# Patient Record
Sex: FEMALE | Race: WHITE | Employment: OTHER
[De-identification: names, ages, dates, MRNs, and addresses within clinical notes are randomized per-mention and may not be internally consistent; named-entity substitution may affect disease eponyms.]

---

## 2017-03-09 ENCOUNTER — OFFICE VISIT (OUTPATIENT)
Dept: FAMILY MEDICINE CLINIC | Facility: CLINIC | Age: 27
End: 2017-03-09

## 2017-03-09 VITALS — WEIGHT: 76 LBS | DIASTOLIC BLOOD PRESSURE: 60 MMHG | BODY MASS INDEX: 19.02 KG/M2 | SYSTOLIC BLOOD PRESSURE: 120 MMHG

## 2017-03-09 DIAGNOSIS — G80.9 INFANTILE CEREBRAL PALSY (HCC): ICD-10-CM

## 2017-03-09 DIAGNOSIS — M25.572 CHRONIC PAIN OF BOTH ANKLES: Primary | ICD-10-CM

## 2017-03-09 DIAGNOSIS — R56.9 SEIZURE (HCC): ICD-10-CM

## 2017-03-09 DIAGNOSIS — M25.571 CHRONIC PAIN OF BOTH ANKLES: Primary | ICD-10-CM

## 2017-03-09 DIAGNOSIS — K59.09 OTHER CONSTIPATION: ICD-10-CM

## 2017-03-09 DIAGNOSIS — G89.29 CHRONIC PAIN OF BOTH ANKLES: Primary | ICD-10-CM

## 2017-03-09 PROCEDURE — 99214 OFFICE O/P EST MOD 30 MIN: CPT | Performed by: FAMILY MEDICINE

## 2017-03-09 RX ORDER — ESLICARBAZEPINE ACETATE 400 MG/1
TABLET ORAL
Refills: 2 | COMMUNITY
Start: 2017-03-05 | End: 2020-10-29 | Stop reason: SDUPTHER

## 2017-03-09 ASSESSMENT — PATIENT HEALTH QUESTIONNAIRE - PHQ9
SUM OF ALL RESPONSES TO PHQ QUESTIONS 1-9: 0
1. LITTLE INTEREST OR PLEASURE IN DOING THINGS: 0
2. FEELING DOWN, DEPRESSED OR HOPELESS: 0
SUM OF ALL RESPONSES TO PHQ9 QUESTIONS 1 & 2: 0

## 2017-03-11 ASSESSMENT — ENCOUNTER SYMPTOMS
COUGH: 0
EYE DISCHARGE: 0
VOMITING: 0
CONSTIPATION: 1
ABDOMINAL PAIN: 0
EYE REDNESS: 0
FACIAL SWELLING: 0
NAUSEA: 0
SHORTNESS OF BREATH: 0

## 2017-03-21 RX ORDER — POLYETHYLENE GLYCOL 3350 17 G/17G
17 POWDER, FOR SOLUTION ORAL DAILY
Qty: 850 G | Refills: 3 | Status: SHIPPED | OUTPATIENT
Start: 2017-03-21 | End: 2017-08-24 | Stop reason: SDUPTHER

## 2017-03-29 ENCOUNTER — HOSPITAL ENCOUNTER (OUTPATIENT)
Dept: GENERAL RADIOLOGY | Age: 27
Discharge: HOME | End: 2017-03-29
Payer: MEDICAID

## 2017-03-29 ENCOUNTER — HOSPITAL ENCOUNTER (OUTPATIENT)
Age: 27
Discharge: HOME | End: 2017-03-29
Payer: MEDICAID

## 2017-03-29 ENCOUNTER — HOSPITAL ENCOUNTER (OUTPATIENT)
Age: 27
End: 2017-03-29
Payer: MEDICAID

## 2017-03-29 DIAGNOSIS — M25.572 CHRONIC PAIN OF BOTH ANKLES: ICD-10-CM

## 2017-03-29 DIAGNOSIS — G89.29 CHRONIC PAIN OF BOTH ANKLES: ICD-10-CM

## 2017-03-29 DIAGNOSIS — M25.571 CHRONIC PAIN OF BOTH ANKLES: ICD-10-CM

## 2017-03-29 DIAGNOSIS — G80.9 INFANTILE CEREBRAL PALSY (HCC): ICD-10-CM

## 2017-03-29 LAB
ABSOLUTE EOS #: 0.1 K/UL (ref 0–0.4)
ABSOLUTE LYMPH #: 1.6 K/UL (ref 1.1–2.7)
ABSOLUTE MONO #: 0.3 K/UL (ref 0–1)
ALBUMIN SERPL-MCNC: 4.2 G/DL (ref 3.5–5.2)
ALBUMIN/GLOBULIN RATIO: ABNORMAL (ref 1–2.5)
ALP BLD-CCNC: 127 U/L (ref 35–104)
ALT SERPL-CCNC: 16 U/L (ref 5–33)
AMMONIA: 39 UMOL/L (ref 11–51)
ANION GAP SERPL CALCULATED.3IONS-SCNC: 14 MMOL/L (ref 9–17)
AST SERPL-CCNC: 10 U/L
BASOPHILS # BLD: 1 % (ref 0–2)
BASOPHILS ABSOLUTE: 0 K/UL (ref 0–0.2)
BILIRUB SERPL-MCNC: 0.2 MG/DL (ref 0.3–1.2)
BILIRUBIN DIRECT: <0.08 MG/DL
BILIRUBIN, INDIRECT: ABNORMAL MG/DL (ref 0–1)
BUN BLDV-MCNC: 11 MG/DL (ref 6–20)
BUN/CREAT BLD: 21 (ref 9–20)
CALCIUM SERPL-MCNC: 9 MG/DL (ref 8.6–10.4)
CARBAMAZEPINE DATE LAST DOSE: ABNORMAL
CARBAMAZEPINE DOSE AMOUNT: ABNORMAL
CARBAMAZEPINE DOSE TIME: ABNORMAL
CARBAMAZEPINE LEVEL: <2.5 UG/ML (ref 8–12)
CHLORIDE BLD-SCNC: 103 MMOL/L (ref 98–107)
CO2: 23 MMOL/L (ref 20–31)
CREAT SERPL-MCNC: 0.53 MG/DL (ref 0.5–0.9)
DIFFERENTIAL TYPE: YES
EOSINOPHILS RELATIVE PERCENT: 1 % (ref 0–5)
GFR AFRICAN AMERICAN: >60 ML/MIN
GFR NON-AFRICAN AMERICAN: >60 ML/MIN
GFR SERPL CREATININE-BSD FRML MDRD: ABNORMAL ML/MIN/{1.73_M2}
GFR SERPL CREATININE-BSD FRML MDRD: ABNORMAL ML/MIN/{1.73_M2}
GLOBULIN: ABNORMAL G/DL (ref 1.5–3.8)
GLUCOSE BLD-MCNC: 89 MG/DL (ref 70–99)
HCT VFR BLD CALC: 43.4 % (ref 36–46)
HEMOGLOBIN: 14.1 G/DL (ref 12–16)
LYMPHOCYTES # BLD: 30 % (ref 15–40)
MCH RBC QN AUTO: 30.1 PG (ref 26–34)
MCHC RBC AUTO-ENTMCNC: 32.6 G/DL (ref 31–37)
MCV RBC AUTO: 92.4 FL (ref 80–100)
MONOCYTES # BLD: 5 % (ref 4–8)
PDW BLD-RTO: 14.4 % (ref 12.1–15.2)
PHENOBARBITAL DATE LAST DOSE: ABNORMAL
PHENOBARBITAL DOSE AMOUNT: ABNORMAL
PHENOBARBITAL TIME LAST DOSE: ABNORMAL
PHENOBARBITAL: 44.6 UG/ML (ref 15–40)
PLATELET # BLD: 191 K/UL (ref 140–450)
PLATELET ESTIMATE: NORMAL
PMV BLD AUTO: NORMAL FL (ref 6–12)
POTASSIUM SERPL-SCNC: 4.3 MMOL/L (ref 3.7–5.3)
RBC # BLD: 4.69 M/UL (ref 4–5.2)
RBC # BLD: NORMAL 10*6/UL
SEG NEUTROPHILS: 63 % (ref 47–75)
SEGMENTED NEUTROPHILS ABSOLUTE COUNT: 3.3 K/UL (ref 2.5–7)
SODIUM BLD-SCNC: 140 MMOL/L (ref 135–144)
TOTAL PROTEIN: 7.5 G/DL (ref 6.4–8.3)
WBC # BLD: 5.2 K/UL (ref 3.5–11)
WBC # BLD: NORMAL 10*3/UL

## 2017-03-29 PROCEDURE — 85025 COMPLETE CBC W/AUTO DIFF WBC: CPT

## 2017-03-29 PROCEDURE — 80184 ASSAY OF PHENOBARBITAL: CPT

## 2017-03-29 PROCEDURE — 80156 ASSAY CARBAMAZEPINE TOTAL: CPT

## 2017-03-29 PROCEDURE — 73610 X-RAY EXAM OF ANKLE: CPT

## 2017-03-29 PROCEDURE — 80076 HEPATIC FUNCTION PANEL: CPT

## 2017-03-29 PROCEDURE — 82140 ASSAY OF AMMONIA: CPT

## 2017-03-29 PROCEDURE — 80048 BASIC METABOLIC PNL TOTAL CA: CPT

## 2017-03-29 PROCEDURE — 36415 COLL VENOUS BLD VENIPUNCTURE: CPT

## 2017-04-25 ENCOUNTER — HOSPITAL ENCOUNTER (OUTPATIENT)
Age: 27
Discharge: HOME | End: 2017-04-25
Payer: MEDICAID

## 2017-04-25 LAB
ABSOLUTE EOS #: 0.1 K/UL (ref 0–0.4)
ABSOLUTE LYMPH #: 1.7 K/UL (ref 1.1–2.7)
ABSOLUTE MONO #: 0.3 K/UL (ref 0–1)
ALBUMIN SERPL-MCNC: 4.1 G/DL (ref 3.5–5.2)
ALBUMIN/GLOBULIN RATIO: ABNORMAL (ref 1–2.5)
ALP BLD-CCNC: 135 U/L (ref 35–104)
ALT SERPL-CCNC: 16 U/L (ref 5–33)
AMMONIA: 34 UMOL/L (ref 11–51)
ANION GAP SERPL CALCULATED.3IONS-SCNC: 15 MMOL/L (ref 9–17)
AST SERPL-CCNC: 13 U/L
BASOPHILS # BLD: 1 %
BASOPHILS ABSOLUTE: 0.1 K/UL (ref 0–0.2)
BILIRUB SERPL-MCNC: 0.2 MG/DL (ref 0.3–1.2)
BILIRUBIN DIRECT: <0.08 MG/DL
BILIRUBIN, INDIRECT: ABNORMAL MG/DL (ref 0–1)
BUN BLDV-MCNC: 6 MG/DL (ref 6–20)
BUN/CREAT BLD: 12 (ref 9–20)
CALCIUM SERPL-MCNC: 9.6 MG/DL (ref 8.6–10.4)
CARBAMAZEPINE DATE LAST DOSE: ABNORMAL
CARBAMAZEPINE DOSE AMOUNT: ABNORMAL
CARBAMAZEPINE DOSE TIME: ABNORMAL
CARBAMAZEPINE LEVEL: <2.5 UG/ML (ref 8–12)
CHLORIDE BLD-SCNC: 101 MMOL/L (ref 98–107)
CO2: 22 MMOL/L (ref 20–31)
CREAT SERPL-MCNC: 0.51 MG/DL (ref 0.5–0.9)
DIFFERENTIAL TYPE: YES
EOSINOPHILS RELATIVE PERCENT: 1 %
GFR AFRICAN AMERICAN: >60 ML/MIN
GFR NON-AFRICAN AMERICAN: >60 ML/MIN
GFR SERPL CREATININE-BSD FRML MDRD: NORMAL ML/MIN/{1.73_M2}
GFR SERPL CREATININE-BSD FRML MDRD: NORMAL ML/MIN/{1.73_M2}
GLOBULIN: ABNORMAL G/DL (ref 1.5–3.8)
GLUCOSE BLD-MCNC: 86 MG/DL (ref 70–99)
HCT VFR BLD CALC: 42.7 % (ref 36–46)
HEMOGLOBIN: 13.9 G/DL (ref 12–16)
LYMPHOCYTES # BLD: 25 %
MCH RBC QN AUTO: 29.5 PG (ref 26–34)
MCHC RBC AUTO-ENTMCNC: 32.6 G/DL (ref 31–37)
MCV RBC AUTO: 90.4 FL (ref 80–100)
MONOCYTES # BLD: 5 %
PDW BLD-RTO: 14.1 % (ref 12.1–15.2)
PHENOBARBITAL DATE LAST DOSE: ABNORMAL
PHENOBARBITAL DOSE AMOUNT: ABNORMAL
PHENOBARBITAL TIME LAST DOSE: ABNORMAL
PHENOBARBITAL: 45.9 UG/ML (ref 15–40)
PLATELET # BLD: 260 K/UL (ref 140–450)
PLATELET ESTIMATE: NORMAL
PMV BLD AUTO: NORMAL FL (ref 6–12)
POTASSIUM SERPL-SCNC: 4.2 MMOL/L (ref 3.7–5.3)
RBC # BLD: 4.72 M/UL (ref 4–5.2)
RBC # BLD: NORMAL 10*6/UL
SEG NEUTROPHILS: 68 %
SEGMENTED NEUTROPHILS ABSOLUTE COUNT: 4.6 K/UL (ref 2.5–7)
SODIUM BLD-SCNC: 138 MMOL/L (ref 135–144)
TOTAL PROTEIN: 7.7 G/DL (ref 6.4–8.3)
WBC # BLD: 6.8 K/UL (ref 3.5–11)
WBC # BLD: NORMAL 10*3/UL

## 2017-04-25 PROCEDURE — 80184 ASSAY OF PHENOBARBITAL: CPT

## 2017-04-25 PROCEDURE — 80076 HEPATIC FUNCTION PANEL: CPT

## 2017-04-25 PROCEDURE — 36415 COLL VENOUS BLD VENIPUNCTURE: CPT

## 2017-04-25 PROCEDURE — 80156 ASSAY CARBAMAZEPINE TOTAL: CPT

## 2017-04-25 PROCEDURE — 85025 COMPLETE CBC W/AUTO DIFF WBC: CPT

## 2017-04-25 PROCEDURE — 80048 BASIC METABOLIC PNL TOTAL CA: CPT

## 2017-04-25 PROCEDURE — 82140 ASSAY OF AMMONIA: CPT

## 2017-07-24 ENCOUNTER — TELEPHONE (OUTPATIENT)
Dept: FAMILY MEDICINE CLINIC | Age: 27
End: 2017-07-24

## 2017-08-24 ENCOUNTER — OFFICE VISIT (OUTPATIENT)
Dept: FAMILY MEDICINE CLINIC | Age: 27
End: 2017-08-24
Payer: MEDICAID

## 2017-08-24 DIAGNOSIS — R56.9 SEIZURE (HCC): Primary | ICD-10-CM

## 2017-08-24 DIAGNOSIS — G80.9 INFANTILE CEREBRAL PALSY (HCC): ICD-10-CM

## 2017-08-24 DIAGNOSIS — K59.09 OTHER CONSTIPATION: ICD-10-CM

## 2017-08-24 PROCEDURE — 99214 OFFICE O/P EST MOD 30 MIN: CPT | Performed by: FAMILY MEDICINE

## 2017-08-24 RX ORDER — POLYETHYLENE GLYCOL 3350 17 G/17G
17 POWDER, FOR SOLUTION ORAL DAILY
Qty: 850 G | Refills: 3 | Status: SHIPPED | OUTPATIENT
Start: 2017-08-24 | End: 2018-03-22 | Stop reason: SDUPTHER

## 2017-08-24 ASSESSMENT — ENCOUNTER SYMPTOMS
FACIAL SWELLING: 0
SHORTNESS OF BREATH: 0
ABDOMINAL PAIN: 0
EYE REDNESS: 0
DIARRHEA: 0
CONSTIPATION: 1
EYE DISCHARGE: 0
VOMITING: 0
COUGH: 0

## 2017-08-28 ENCOUNTER — TELEPHONE (OUTPATIENT)
Dept: FAMILY MEDICINE CLINIC | Age: 27
End: 2017-08-28

## 2017-09-26 DIAGNOSIS — G80.9 INFANTILE CEREBRAL PALSY (HCC): Primary | ICD-10-CM

## 2018-01-22 DIAGNOSIS — G80.9 INFANTILE CEREBRAL PALSY (HCC): ICD-10-CM

## 2018-01-22 DIAGNOSIS — N39.42 URINARY INCONTINENCE WITHOUT SENSORY AWARENESS: Primary | ICD-10-CM

## 2018-03-22 ENCOUNTER — OFFICE VISIT (OUTPATIENT)
Dept: FAMILY MEDICINE CLINIC | Age: 28
End: 2018-03-22
Payer: MEDICAID

## 2018-03-22 VITALS — DIASTOLIC BLOOD PRESSURE: 80 MMHG | TEMPERATURE: 98.2 F | SYSTOLIC BLOOD PRESSURE: 116 MMHG

## 2018-03-22 DIAGNOSIS — R56.9 SEIZURE (HCC): Primary | ICD-10-CM

## 2018-03-22 DIAGNOSIS — H61.21 IMPACTED CERUMEN OF RIGHT EAR: ICD-10-CM

## 2018-03-22 DIAGNOSIS — F32.89 OTHER DEPRESSION: ICD-10-CM

## 2018-03-22 DIAGNOSIS — K59.09 OTHER CONSTIPATION: ICD-10-CM

## 2018-03-22 PROCEDURE — 99214 OFFICE O/P EST MOD 30 MIN: CPT | Performed by: FAMILY MEDICINE

## 2018-03-22 RX ORDER — QUETIAPINE FUMARATE 25 MG/1
TABLET, FILM COATED ORAL
Refills: 1 | COMMUNITY
Start: 2018-02-20

## 2018-03-22 RX ORDER — POLYETHYLENE GLYCOL 3350 17 G/17G
17 POWDER, FOR SOLUTION ORAL DAILY
Qty: 850 G | Refills: 3 | Status: SHIPPED | OUTPATIENT
Start: 2018-03-22 | End: 2019-05-13 | Stop reason: SDUPTHER

## 2018-03-22 NOTE — PROGRESS NOTES
DO Nisa   PERIOGARD 0.12 % solution Rinse with 1/2 ounce TWICE DAILY 8/16/16   Historical Provider, MD   Misc. Devices ([address]) [address] [facility] Gait  with wheels, square with seat sling seat and braces 6/17/16   Henry Newman DO   naproxen sodium (ANAPROX) 550 MG tablet  6/1/16   Historical Provider, MD   Ankle Foot Arthosis MISC by Does not apply route. Bilateral AFO brace 2/4/15   Lisa Carnes MD   ranitidine (ZANTAC) 150 MG tablet Take 150 mg by mouth 2 times daily. Historical Provider, MD        Allergies:       Patient has no known allergies. Social History:     Tobacco:    reports that she has never smoked. She has never used smokeless tobacco.  Alcohol:      reports that she does not drink alcohol. Drug Use:  has no drug history on file. Family History:     History reviewed. No pertinent family history. Review of Systems:       Review of Systems   Constitutional: Positive for activity change. Negative for chills and fever. Not using walker anymore. HENT:        Rt ear fullness   Eyes: Negative for discharge and redness. Respiratory: Negative for cough and shortness of breath. Gastrointestinal: Positive for constipation. Negative for blood in stool, diarrhea and vomiting. Genitourinary: Negative for difficulty urinating. Skin: Negative for pallor and rash. Neurological: Negative for dizziness, seizures, syncope, facial asymmetry and light-headedness. Psychiatric/Behavioral: Positive for dysphoric mood. The patient is nervous/anxious. Physical Exam:     Physical Exam   Constitutional: She is oriented to person, place, and time. She appears well-developed and well-nourished. HENT:   Head: Normocephalic and atraumatic. Left Ear: Tympanic membrane is not injected, not erythematous and not bulging. Rt TM not seen due to cerumen impaction in the ear canal.   Eyes: Conjunctivae are normal. Pupils are equal, round, and reactive to light.    Neck: Neck

## 2018-03-28 ASSESSMENT — ENCOUNTER SYMPTOMS
EYE REDNESS: 0
BLOOD IN STOOL: 0
COUGH: 0
SHORTNESS OF BREATH: 0
DIARRHEA: 0
CONSTIPATION: 1
EYE DISCHARGE: 0
VOMITING: 0

## 2018-10-16 DIAGNOSIS — N39.42 URINARY INCONTINENCE WITHOUT SENSORY AWARENESS: ICD-10-CM

## 2018-10-16 DIAGNOSIS — G80.9 INFANTILE CEREBRAL PALSY (HCC): ICD-10-CM

## 2018-11-20 ENCOUNTER — HOSPITAL ENCOUNTER (OUTPATIENT)
Dept: CT IMAGING | Age: 28
Discharge: HOME | End: 2018-11-22
Payer: MEDICAID

## 2018-11-20 ENCOUNTER — HOSPITAL ENCOUNTER (OUTPATIENT)
Age: 28
Discharge: HOME | End: 2018-11-20
Payer: MEDICAID

## 2018-11-20 DIAGNOSIS — G40.219 LOCALIZATION-RELATED (FOCAL) (PARTIAL) SYMPTOMATIC EPILEPSY AND EPILEPTIC SYNDROMES WITH COMPLEX PARTIAL SEIZURES, INTRACTABLE, WITHOUT STATUS EPILEPTICUS (HCC): ICD-10-CM

## 2018-11-20 LAB
ABSOLUTE EOS #: 0.1 K/UL (ref 0–0.4)
ABSOLUTE IMMATURE GRANULOCYTE: NORMAL K/UL (ref 0–0.3)
ABSOLUTE LYMPH #: 1.1 K/UL (ref 1–4.8)
ABSOLUTE MONO #: 0.2 K/UL (ref 0–1)
ALBUMIN SERPL-MCNC: 4.6 G/DL (ref 3.5–5.2)
ALBUMIN/GLOBULIN RATIO: ABNORMAL (ref 1–2.5)
ALP BLD-CCNC: 99 U/L (ref 35–104)
ALT SERPL-CCNC: 14 U/L (ref 5–33)
ANION GAP SERPL CALCULATED.3IONS-SCNC: 14 MMOL/L (ref 9–17)
AST SERPL-CCNC: 12 U/L
BASOPHILS # BLD: 0 % (ref 0–2)
BASOPHILS ABSOLUTE: 0 K/UL (ref 0–0.2)
BILIRUB SERPL-MCNC: 0.17 MG/DL (ref 0.3–1.2)
BILIRUBIN DIRECT: <0.08 MG/DL
BILIRUBIN, INDIRECT: ABNORMAL MG/DL (ref 0–1)
BUN BLDV-MCNC: 7 MG/DL (ref 6–20)
BUN/CREAT BLD: 13 (ref 9–20)
CALCIUM SERPL-MCNC: 9.3 MG/DL (ref 8.6–10.4)
CHLORIDE BLD-SCNC: 105 MMOL/L (ref 98–107)
CO2: 22 MMOL/L (ref 20–31)
CREAT SERPL-MCNC: 0.54 MG/DL (ref 0.5–0.9)
DIFFERENTIAL TYPE: YES
EOSINOPHILS RELATIVE PERCENT: 1 % (ref 0–5)
GFR AFRICAN AMERICAN: >60 ML/MIN
GFR NON-AFRICAN AMERICAN: >60 ML/MIN
GFR SERPL CREATININE-BSD FRML MDRD: ABNORMAL ML/MIN/{1.73_M2}
GFR SERPL CREATININE-BSD FRML MDRD: ABNORMAL ML/MIN/{1.73_M2}
GLOBULIN: ABNORMAL G/DL (ref 1.5–3.8)
GLUCOSE BLD-MCNC: 100 MG/DL (ref 70–99)
HCT VFR BLD CALC: 45.1 % (ref 36–46)
HEMOGLOBIN: 14.6 G/DL (ref 12–16)
IMMATURE GRANULOCYTES: NORMAL %
LYMPHOCYTES # BLD: 21 % (ref 15–40)
MCH RBC QN AUTO: 30 PG (ref 26–34)
MCHC RBC AUTO-ENTMCNC: 32.4 G/DL (ref 31–37)
MCV RBC AUTO: 92.6 FL (ref 80–100)
MONOCYTES # BLD: 4 % (ref 4–8)
NRBC AUTOMATED: NORMAL PER 100 WBC
PDW BLD-RTO: 14.8 % (ref 12.1–15.2)
PHENOBARBITAL DATE LAST DOSE: ABNORMAL
PHENOBARBITAL DOSE AMOUNT: ABNORMAL
PHENOBARBITAL TIME LAST DOSE: ABNORMAL
PHENOBARBITAL: 43.7 UG/ML (ref 15–40)
PLATELET # BLD: 204 K/UL (ref 140–450)
PLATELET ESTIMATE: NORMAL
PMV BLD AUTO: NORMAL FL (ref 6–12)
POTASSIUM SERPL-SCNC: 4.7 MMOL/L (ref 3.7–5.3)
RBC # BLD: 4.87 M/UL (ref 4–5.2)
RBC # BLD: NORMAL 10*6/UL
SEG NEUTROPHILS: 74 % (ref 47–75)
SEGMENTED NEUTROPHILS ABSOLUTE COUNT: 3.9 K/UL (ref 2.5–7)
SODIUM BLD-SCNC: 141 MMOL/L (ref 135–144)
TOTAL PROTEIN: 7.9 G/DL (ref 6.4–8.3)
WBC # BLD: 5.4 K/UL (ref 3.5–11)
WBC # BLD: NORMAL 10*3/UL

## 2018-11-20 PROCEDURE — 70470 CT HEAD/BRAIN W/O & W/DYE: CPT

## 2018-11-20 PROCEDURE — 36415 COLL VENOUS BLD VENIPUNCTURE: CPT

## 2018-11-20 PROCEDURE — 80048 BASIC METABOLIC PNL TOTAL CA: CPT

## 2018-11-20 PROCEDURE — 6360000004 HC RX CONTRAST MEDICATION: Performed by: PSYCHIATRY & NEUROLOGY

## 2018-11-20 PROCEDURE — 80184 ASSAY OF PHENOBARBITAL: CPT

## 2018-11-20 PROCEDURE — 80076 HEPATIC FUNCTION PANEL: CPT

## 2018-11-20 PROCEDURE — 85025 COMPLETE CBC W/AUTO DIFF WBC: CPT

## 2018-11-20 RX ADMIN — IOPAMIDOL 50 ML: 612 INJECTION, SOLUTION INTRAVENOUS at 14:08

## 2019-05-14 RX ORDER — POLYETHYLENE GLYCOL 3350 17 G/17G
POWDER, FOR SOLUTION ORAL
Qty: 510 G | Refills: 1 | Status: SHIPPED | OUTPATIENT
Start: 2019-05-14 | End: 2019-12-19 | Stop reason: SDUPTHER

## 2019-06-19 DIAGNOSIS — N39.42 URINARY INCONTINENCE WITHOUT SENSORY AWARENESS: ICD-10-CM

## 2019-06-19 DIAGNOSIS — G80.9 INFANTILE CEREBRAL PALSY (HCC): ICD-10-CM

## 2019-06-19 NOTE — TELEPHONE ENCOUNTER
Requesting a refill on Diapers and [address] [facility] Maintenance   Topic Date Due    Varicella Vaccine (1 of 2 - 13+ 2-dose series) 07/09/2003    HIV screen  07/09/2005    Cervical cancer screen  07/09/2011    Flu vaccine (Season Ended) 09/01/2019    DTaP/Tdap/Td vaccine (2 - Td) 06/10/2026    Pneumococcal 0-64 years Vaccine  Aged Out             (applicable per patient's age: Cancer Screenings, Depression Screening, Fall Risk Screening, Immunizations)    AST (U/L)   Date Value   11/20/2018 12     ALT (U/L)   Date Value   11/20/2018 14     BUN (mg/dL)   Date Value   11/20/2018 7      (goal A1C is < 7)   (goal LDL is <100) need 30-50% reduction from baseline     BP Readings from Last 3 Encounters:   03/22/18 116/80   03/09/17 120/60   12/08/16 108/64    (goal /80)      All Future Testing planned in [facility]:  Lab Frequency Next Occurrence       Next Visit Date:  No future appointments.          Patient Active Problem List:     Infantile cerebral palsy (HCC)     Congenital quadriplegia     Seizure ([facility] [address]     Pyelonephritis due to Escherichia coli     Ureteral stone with hydronephrosis     Renal lithiasis     Chronic bilateral low back pain without sciatica

## 2019-12-19 DIAGNOSIS — G80.9 INFANTILE CEREBRAL PALSY (HCC): ICD-10-CM

## 2019-12-19 DIAGNOSIS — N39.42 URINARY INCONTINENCE WITHOUT SENSORY AWARENESS: ICD-10-CM

## 2019-12-19 RX ORDER — POLYETHYLENE GLYCOL 3350 17 G/17G
POWDER, FOR SOLUTION ORAL
Qty: 510 G | Refills: 1 | Status: SHIPPED | OUTPATIENT
Start: 2019-12-19 | End: 2020-07-13 | Stop reason: SDUPTHER

## 2020-02-07 ENCOUNTER — HOSPITAL ENCOUNTER (OUTPATIENT)
Age: 30
Discharge: HOME | End: 2020-02-07
Payer: MEDICAID

## 2020-02-07 LAB
ABSOLUTE EOS #: 0.1 K/UL (ref 0–0.4)
ABSOLUTE IMMATURE GRANULOCYTE: ABNORMAL K/UL (ref 0–0.3)
ABSOLUTE LYMPH #: 1.3 K/UL (ref 1–4.8)
ABSOLUTE MONO #: 0.3 K/UL (ref 0–1)
ALBUMIN SERPL-MCNC: 4.6 G/DL (ref 3.5–5.2)
ALBUMIN/GLOBULIN RATIO: ABNORMAL (ref 1–2.5)
ALP BLD-CCNC: 156 U/L (ref 35–104)
ALT SERPL-CCNC: 11 U/L (ref 5–33)
ANION GAP SERPL CALCULATED.3IONS-SCNC: 15 MMOL/L (ref 9–17)
AST SERPL-CCNC: 11 U/L
BASOPHILS # BLD: 1 % (ref 0–2)
BASOPHILS ABSOLUTE: 0 K/UL (ref 0–0.2)
BILIRUB SERPL-MCNC: 0.2 MG/DL (ref 0.3–1.2)
BILIRUBIN DIRECT: <0.08 MG/DL
BILIRUBIN, INDIRECT: ABNORMAL MG/DL (ref 0–1)
BUN BLDV-MCNC: 13 MG/DL (ref 6–20)
BUN/CREAT BLD: 27 (ref 9–20)
CALCIUM SERPL-MCNC: 9.8 MG/DL (ref 8.6–10.4)
CHLORIDE BLD-SCNC: 104 MMOL/L (ref 98–107)
CO2: 19 MMOL/L (ref 20–31)
CREAT SERPL-MCNC: 0.48 MG/DL (ref 0.5–0.9)
DIFFERENTIAL TYPE: ABNORMAL
EOSINOPHILS RELATIVE PERCENT: 1 % (ref 0–5)
GFR AFRICAN AMERICAN: >60 ML/MIN
GFR NON-AFRICAN AMERICAN: >60 ML/MIN
GFR SERPL CREATININE-BSD FRML MDRD: ABNORMAL ML/MIN/{1.73_M2}
GFR SERPL CREATININE-BSD FRML MDRD: ABNORMAL ML/MIN/{1.73_M2}
GLOBULIN: ABNORMAL G/DL (ref 1.5–3.8)
GLUCOSE BLD-MCNC: 106 MG/DL (ref 70–99)
HCT VFR BLD CALC: 40.7 % (ref 36–46)
HEMOGLOBIN: 13.4 G/DL (ref 12–16)
IMMATURE GRANULOCYTES: ABNORMAL %
LYMPHOCYTES # BLD: 28 % (ref 15–40)
MCH RBC QN AUTO: 30.8 PG (ref 26–34)
MCHC RBC AUTO-ENTMCNC: 32.8 G/DL (ref 31–37)
MCV RBC AUTO: 93.6 FL (ref 80–100)
MONOCYTES # BLD: 6 % (ref 4–8)
MORPHOLOGY: ABNORMAL
MORPHOLOGY: ABNORMAL
NRBC AUTOMATED: ABNORMAL PER 100 WBC
PDW BLD-RTO: 16.9 % (ref 12.1–15.2)
PLATELET # BLD: 218 K/UL (ref 140–450)
PLATELET ESTIMATE: ABNORMAL
PMV BLD AUTO: ABNORMAL FL (ref 6–12)
POTASSIUM SERPL-SCNC: 3.9 MMOL/L (ref 3.7–5.3)
RBC # BLD: 4.35 M/UL (ref 4–5.2)
RBC # BLD: ABNORMAL 10*6/UL
SEG NEUTROPHILS: 64 % (ref 47–75)
SEGMENTED NEUTROPHILS ABSOLUTE COUNT: 3 K/UL (ref 2.5–7)
SODIUM BLD-SCNC: 138 MMOL/L (ref 135–144)
TOTAL PROTEIN: 8.3 G/DL (ref 6.4–8.3)
WBC # BLD: 4.7 K/UL (ref 3.5–11)
WBC # BLD: ABNORMAL 10*3/UL

## 2020-02-07 PROCEDURE — 36415 COLL VENOUS BLD VENIPUNCTURE: CPT

## 2020-02-07 PROCEDURE — 80184 ASSAY OF PHENOBARBITAL: CPT

## 2020-02-07 PROCEDURE — 85025 COMPLETE CBC W/AUTO DIFF WBC: CPT

## 2020-02-07 PROCEDURE — 80048 BASIC METABOLIC PNL TOTAL CA: CPT

## 2020-02-07 PROCEDURE — 80076 HEPATIC FUNCTION PANEL: CPT

## 2020-02-08 LAB
PHENOBARBITAL DATE LAST DOSE: ABNORMAL
PHENOBARBITAL DOSE AMOUNT: ABNORMAL
PHENOBARBITAL TIME LAST DOSE: 900
PHENOBARBITAL: 44.9 UG/ML (ref 15–40)

## 2020-07-13 RX ORDER — POLYETHYLENE GLYCOL 3350 17 G/17G
POWDER, FOR SOLUTION ORAL
Qty: 507 G | Refills: 0 | Status: SHIPPED | OUTPATIENT
Start: 2020-07-13 | End: 2020-10-22 | Stop reason: SDUPTHER

## 2020-07-13 NOTE — TELEPHONE ENCOUNTER
Patient needs a new script for Miralax - patient uses [facility] Maintenance   Topic Date Due    Varicella vaccine (1 of 2 - 2-dose childhood series) 07/09/1991    HIV screen  07/09/2005    Cervical cancer screen  07/09/2011    Flu vaccine (1) 09/01/2020    DTaP/Tdap/Td vaccine (2 - Td) 06/10/2026    Hepatitis A vaccine  Aged Out    Hepatitis B vaccine  Aged Out    Hib vaccine  Aged Out    Meningococcal (ACWY) vaccine  Aged Out    Pneumococcal 0-64 years Vaccine  Aged Out             (applicable per patient's age: Cancer Screenings, Depression Screening, Fall Risk Screening, Immunizations)    AST (U/L)   Date Value   02/07/2020 11     ALT (U/L)   Date Value   02/07/2020 11     BUN (mg/dL)   Date Value   02/07/2020 13      (goal A1C is < 7)   (goal LDL is <100) need 30-50% reduction from baseline     BP Readings from Last 3 Encounters:   03/22/18 116/80   03/09/17 120/60   12/08/16 108/64    (goal /80)      All Future Testing planned in [facility]:  Lab Frequency Next Occurrence       Next Visit Date:  No future appointments.          Patient Active Problem List:     Infantile cerebral palsy (HCC)     Congenital quadriplegia     Seizure ([address]     Pyelonephritis due to Escherichia coli     Ureteral stone with hydronephrosis     Renal lithiasis     Chronic bilateral low back pain without sciatica

## 2020-07-13 NOTE — TELEPHONE ENCOUNTER
Medication pending  Last OV: 3/22/2018  Last RX: 12/19/19   Next scheduled apt: Visit date not found

## 2020-07-30 NOTE — TELEPHONE ENCOUNTER
Attempt to contact, no answer, no vm      Last visit:  3/22/2018  Next Visit Date:  No future appointments. Last Med refill:    Medication List:  Prior to Admission medications    Medication Sig Start Date End Date Taking? Authorizing Provider   polyethylene glycol (GLYCOLAX) 17 GM/SCOOP powder mix 17 grams with liquid and drink BY MOUTH daily 7/13/20   Wilbur Brower MD   Diapers & Supplies MISC (Adult medium)  Use daily as needed. 12/19/19   Wilbur Brower MD   QUEtiapine (SEROQUEL) 25 MG tablet take 1/2 tablet by mouth at bedtime 2/20/18   Historical Provider, MD   [address] 1 each by Does not apply route daily as needed (as needed) Adult LG 4/7/17   Olivia Paula, DO   APTIOM 400 MG TABS TAKE 1 TABLET BY MOUTH EVERY DAY 3/5/17   Historical Provider, MD   PERIOGARD 0.12 % solution Rinse with 1/2 ounce TWICE DAILY 8/16/16   Historical Provider, MD [employer]. Devices ([address]) [address] [facility] Gait  with wheels, square with seat sling seat and braces 6/17/16   Harden Dollar, DO   naproxen sodium (ANAPROX) 550 MG tablet  6/1/16   Historical Provider, MD   Ankle Foot Arthosis MISC by Does not apply route. Bilateral AFO brace 2/4/15   Wilbur Brower MD   ranitidine (ZANTAC) 150 MG tablet Take 150 mg by mouth 2 times daily. Historical Provider, MD   phenobarbital (LUMINAL) 32.4 MG tablet Take 2 tabs every morning, and 3 tabs at bedtime     Historical Provider, MD       Allergies:  Patient has no known allergies.     No results found for: LABA1C          ( goal A1C is < 7)   No results found for: LABMICR  No results found for: LDLCHOLESTEROL, LDLCALC    (goal LDL is <100)   AST (U/L)   Date Value   02/07/2020 11     ALT (U/L)   Date Value   02/07/2020 11     BUN (mg/dL)   Date Value   02/07/2020 13     BP Readings from Last 3 Encounters:   03/22/18 116/80   03/09/17 120/60   12/08/16 108/64          (goal 120/80)

## 2020-10-20 NOTE — TELEPHONE ENCOUNTER
Last OV: 3/22/18  Last RX:    Next scheduled apt: Visit date not found, I attempted to call to see if Pt is planning to make appt for check up. She hasn't been seen in 2 1/2 years, we need documentation for Pt insurance to cover her diapers and supplies.

## 2020-10-20 NOTE — TELEPHONE ENCOUNTER
Requesting a refill on Diapers & Supplies and Polyethylene glycol powder    Drug [address] Maintenance   Topic Date Due    Varicella vaccine (1 of 2 - 2-dose childhood series) 07/09/1991    HIV screen  07/09/2005    Cervical cancer screen  07/09/2011    Flu vaccine (1) 09/01/2020    DTaP/Tdap/Td vaccine (2 - Td) 06/10/2026    Hepatitis A vaccine  Aged Out    Hepatitis B vaccine  Aged Out    Hib vaccine  Aged Out    Meningococcal (ACWY) vaccine  Aged Out    Pneumococcal 0-64 years Vaccine  Aged Out             (applicable per patient's age: Cancer Screenings, Depression Screening, Fall Risk Screening, Immunizations)    AST (U/L)   Date Value   02/07/2020 11     ALT (U/L)   Date Value   02/07/2020 11     BUN (mg/dL)   Date Value   02/07/2020 13      (goal A1C is < 7)   (goal LDL is <100) need 30-50% reduction from baseline     BP Readings from Last 3 Encounters:   03/22/18 116/80   03/09/17 120/60   12/08/16 108/64    (goal /80)      All Future Testing planned in [facility]:  Lab Frequency Next Occurrence       Next Visit Date:  No future appointments.          Patient Active Problem List:     Infantile cerebral palsy (HCC)     Congenital quadriplegia     Seizure ([facility] [address]     Pyelonephritis due to Escherichia coli     Ureteral stone with hydronephrosis     Renal lithiasis     Chronic bilateral low back pain without sciatica

## 2020-10-22 RX ORDER — POLYETHYLENE GLYCOL 3350 17 G/17G
POWDER, FOR SOLUTION ORAL
Qty: 507 G | Refills: 0 | Status: SHIPPED | OUTPATIENT
Start: 2020-10-22 | End: 2020-10-29 | Stop reason: SDUPTHER

## 2020-10-29 ENCOUNTER — VIRTUAL VISIT (OUTPATIENT)
Dept: FAMILY MEDICINE CLINIC | Age: 30
End: 2020-10-29
Payer: MEDICAID

## 2020-10-29 PROCEDURE — 99214 OFFICE O/P EST MOD 30 MIN: CPT | Performed by: FAMILY MEDICINE

## 2020-10-29 RX ORDER — POLYETHYLENE GLYCOL 3350 17 G/17G
POWDER, FOR SOLUTION ORAL
Qty: 507 G | Refills: 5 | Status: SHIPPED | OUTPATIENT
Start: 2020-10-29 | End: 2021-11-15

## 2020-10-29 RX ORDER — ESLICARBAZEPINE ACETATE 600 MG/1
600 TABLET ORAL DAILY
COMMUNITY
Start: 2020-10-24

## 2020-10-29 SDOH — ECONOMIC STABILITY: TRANSPORTATION INSECURITY
IN THE PAST 12 MONTHS, HAS LACK OF TRANSPORTATION KEPT YOU FROM MEETINGS, WORK, OR FROM GETTING THINGS NEEDED FOR DAILY LIVING?: NO

## 2020-10-29 SDOH — ECONOMIC STABILITY: TRANSPORTATION INSECURITY
IN THE PAST 12 MONTHS, HAS THE LACK OF TRANSPORTATION KEPT YOU FROM MEDICAL APPOINTMENTS OR FROM GETTING MEDICATIONS?: NO

## 2020-10-29 SDOH — ECONOMIC STABILITY: FOOD INSECURITY: WITHIN THE PAST 12 MONTHS, THE FOOD YOU BOUGHT JUST DIDN'T LAST AND YOU DIDN'T HAVE MONEY TO GET MORE.: NEVER TRUE

## 2020-10-29 SDOH — ECONOMIC STABILITY: INCOME INSECURITY: HOW HARD IS IT FOR YOU TO PAY FOR THE VERY BASICS LIKE FOOD, HOUSING, MEDICAL CARE, AND HEATING?: NOT HARD AT ALL

## 2020-10-29 SDOH — ECONOMIC STABILITY: FOOD INSECURITY: WITHIN THE PAST 12 MONTHS, YOU WORRIED THAT YOUR FOOD WOULD RUN OUT BEFORE YOU GOT MONEY TO BUY MORE.: NEVER TRUE

## 2020-10-29 ASSESSMENT — ENCOUNTER SYMPTOMS
FACIAL SWELLING: 0
EYE DISCHARGE: 0
ABDOMINAL PAIN: 0
SORE THROAT: 0
SHORTNESS OF BREATH: 0
EYE REDNESS: 0
VOMITING: 0
COUGH: 0
DIARRHEA: 0

## 2020-10-29 ASSESSMENT — PATIENT HEALTH QUESTIONNAIRE - PHQ9
SUM OF ALL RESPONSES TO PHQ QUESTIONS 1-9: 0
SUM OF ALL RESPONSES TO PHQ9 QUESTIONS 1 & 2: 0
SUM OF ALL RESPONSES TO PHQ QUESTIONS 1-9: 0
SUM OF ALL RESPONSES TO PHQ QUESTIONS 1-9: 0
1. LITTLE INTEREST OR PLEASURE IN DOING THINGS: 0
2. FEELING DOWN, DEPRESSED OR HOPELESS: 0

## 2020-10-29 NOTE — PROGRESS NOTES
VIRTUAL phone call  Jocy Leung is a 27 y.o. female evaluated via telephone on 10/29/2020. Consent:  She and/or health care decision maker is aware that that she may receive a bill for this telephone service, depending on her insurance coverage, and has provided verbal consent to proceed: Yes      Documentation:  I communicated with the patient and/or health care decision maker about ---see note below. Details of this discussion including any medical advice provided: see note below      I affirm this is a Patient Initiated Episode with a Patient who has not had a related appointment within my department in the past 7 days or scheduled within the next 24 hours. Patient identification was verified at the start of the visit: Yes    Total Time: minutes: 21-30 minutes    Note: not billable if this call serves to triage the patient into an appointment for the relevant concern      Sharon Conklin     [facility] Notes    Name: Jocy Leung  : 1990        Chief Complaint:     Chief Complaint   Patient presents with    Check-Up       History of Present Illness:     Jocy Leung is a 27 y.o.  female who presents with Check-Up      HPI  Cerebral palsy- Pt doing phone visit as concern for COVID and doesn't want to wear a mask. Pt is doing ok and lives at home with her dad. Pt is still seeing neurology, Dr Franki Graves. We updated her medication list and NKDA. Brittaney [place] Pt is now taking seroquel \"just every once in awhile\" to help her \"calm down\". Pt states Dr Franki Graves gave her the medication. Pt has good BMs taking the Glycolax daily. Pt needs refill on glycolax. Pt is in a wheelchair. Pt has incontinence of urine so needs her diaper supplies. Pt feels good but has some achiness on her period and then has a chill with a bowel movements but these are not new symptoms for her. Dad who is the main caregiver is also on the phone with pt.  Pt's biggest concern today is when she can get a new wheelchair and bed as hers are Yes Historical Provider, MD   Ankle Foot Arthosis MISC by Does not apply route. Bilateral AFO brace 2/4/15  Yes Angel Grant MD   phenobarbital (LUMINAL) 32.4 MG tablet Take 2 tabs every morning, and 3 tabs at bedtime    Yes Historical Provider, MD        Allergies:       Patient has no known allergies. Social History:     Tobacco:    reports that she has never smoked. She has never used smokeless tobacco.  Alcohol:      reports no history of alcohol use. Drug Use:  has no history on file for drug. Family History:     No family history on file. Review of Systems:       Review of Systems   Constitutional: Negative for chills and fever. HENT: Negative for congestion, ear pain, facial swelling and sore throat. Eyes: Negative for discharge and redness. Respiratory: Negative for cough and shortness of breath. Cardiovascular: Negative for chest pain, palpitations and leg swelling. Gastrointestinal: Negative for abdominal pain, diarrhea and vomiting. Genitourinary:        Urinary incontinence   Skin: Negative for rash. Neurological: Negative for dizziness and headaches. Psychiatric/Behavioral: Negative for sleep disturbance. Physical Exam:     PHONE visit so no exam     Physical Exam  Neurological:      Mental Status: She is alert. Psychiatric:         Mood and Affect: Mood normal.         Vitals: There were no vitals taken for this visit.       Data:     Lab Results   Component Value Date     02/07/2020    K 3.9 02/07/2020     02/07/2020    CO2 19 02/07/2020    BUN 13 02/07/2020    CREATININE 0.48 02/07/2020    GLUCOSE 106 02/07/2020    GLUCOSE 77 01/20/2012    PROT 8.3 02/07/2020    LABALBU 4.6 02/07/2020    LABALBU 4.7 01/20/2012    BILITOT 0.20 02/07/2020    ALKPHOS 156 02/07/2020    AST 11 02/07/2020    ALT 11 02/07/2020     Lab Results   Component Value Date    WBC 4.7 02/07/2020    RBC 4.35 02/07/2020    RBC 4.59 01/20/2012    HGB 13.4 02/07/2020    HCT 40.7 02/07/2020    MCV 93.6 02/07/2020    MCH 30.8 02/07/2020    MCHC 32.8 02/07/2020    RDW 16.9 02/07/2020     02/07/2020     01/20/2012    MPV NOT REPORTED 02/07/2020     No results found for: TSH  No results found for: CHOL, HDL, PSA, LABA1C       Assessment/Plan:        1. Infantile cerebral palsy (HCC)  Pt is doing ok and will try to see her in the spring and then pt will need an new bed and wheelchair like she has now in the spring. She will make face to face appt then. Continue on current meds and seeing Neurology. Pt to also continue with diapers. - [address]; 1 each by Does not apply route daily as needed (as needed) Adult LG  Dispense: 180 each; Refill: 3    Return in about 6 months (around 4/29/2021).       Electronically signed by Gretchen Collazo MD on 10/29/2020 at 11:49 AM

## 2020-11-09 NOTE — TELEPHONE ENCOUNTER
Last OV: 10/29/2020    Patient needs refill on Adult diapers, only received 50 and needs quantity of 160 please.  Diapers pending for approval.

## 2021-01-08 ENCOUNTER — TELEPHONE (OUTPATIENT)
Dept: FAMILY MEDICINE CLINIC | Age: 31
End: 2021-01-08

## 2021-01-08 NOTE — TELEPHONE ENCOUNTER
Pierce Cox from Dept of DD called requesting help with Amie's seizure medication - thinking maybe it needs prior authorization - she did not know the name of the medication so if we don't know, we can call either Fritz or [address] [place] [address]   Topic Date Due    Hepatitis C screen  1990    Varicella vaccine (1 of 2 - 2-dose childhood series) 07/09/1991    HIV screen  07/09/2005    Cervical cancer screen  07/09/2011    Flu vaccine (1) 09/01/2020    DTaP/Tdap/Td vaccine (2 - Td) 06/10/2026    Hepatitis A vaccine  Aged Out    Hepatitis B vaccine  Aged Out    Hib vaccine  Aged Out    Meningococcal (ACWY) vaccine  Aged Out    Pneumococcal 0-64 years Vaccine  Aged Out             (applicable per patient's age: Cancer Screenings, Depression Screening, Fall Risk Screening, Immunizations)    AST (U/L)   Date Value   02/07/2020 11     ALT (U/L)   Date Value   02/07/2020 11     BUN (mg/dL)   Date Value   02/07/2020 13      (goal A1C is < 7)   (goal LDL is <100) need 30-50% reduction from baseline     BP Readings from Last 3 Encounters:   03/22/18 116/80   03/09/17 120/60   12/08/16 108/64    (goal /80)      All Future Testing planned in CarePATH:  Lab Frequency Next Occurrence       Next Visit Date:  No future appointments.          Patient Active Problem List:     Infantile cerebral palsy (HCC)     Congenital quadriplegia     Seizure ([address]     Pyelonephritis due to Escherichia coli     Ureteral stone with hydronephrosis     Renal lithiasis     Chronic bilateral low back pain without sciatica

## 2021-11-15 RX ORDER — POLYETHYLENE GLYCOL 3350 17 G/17G
POWDER, FOR SOLUTION ORAL
Qty: 510 G | Refills: 1 | Status: SHIPPED | OUTPATIENT
Start: 2021-11-15

## 2021-11-15 NOTE — TELEPHONE ENCOUNTER
Last OV: 10/29/2020 infantile cerebral palsy   Last RX:   Next scheduled apt: Visit date not found      Sure scripts request    RX pending

## 2021-12-10 ENCOUNTER — HOSPITAL ENCOUNTER (OUTPATIENT)
Age: 31
Discharge: HOME | End: 2021-12-10
Payer: COMMERCIAL

## 2021-12-10 LAB
ABSOLUTE EOS #: 0.1 K/UL (ref 0–0.4)
ABSOLUTE IMMATURE GRANULOCYTE: ABNORMAL K/UL (ref 0–0.3)
ABSOLUTE LYMPH #: 1.1 K/UL (ref 1–4.8)
ABSOLUTE MONO #: 0.3 K/UL (ref 0–1)
ALBUMIN SERPL-MCNC: 4.4 G/DL (ref 3.5–5.2)
ALBUMIN/GLOBULIN RATIO: ABNORMAL (ref 1–2.5)
ALP BLD-CCNC: 142 U/L (ref 35–104)
ALT SERPL-CCNC: 11 U/L (ref 5–33)
ANION GAP SERPL CALCULATED.3IONS-SCNC: 13 MMOL/L (ref 9–17)
AST SERPL-CCNC: 10 U/L
BASOPHILS # BLD: 0 % (ref 0–2)
BASOPHILS ABSOLUTE: 0 K/UL (ref 0–0.2)
BILIRUB SERPL-MCNC: 0.21 MG/DL (ref 0.3–1.2)
BILIRUBIN DIRECT: <0.08 MG/DL
BILIRUBIN, INDIRECT: ABNORMAL MG/DL (ref 0–1)
BUN BLDV-MCNC: 6 MG/DL (ref 6–20)
BUN/CREAT BLD: 13 (ref 9–20)
CALCIUM SERPL-MCNC: 9.4 MG/DL (ref 8.6–10.4)
CHLORIDE BLD-SCNC: 104 MMOL/L (ref 98–107)
CO2: 22 MMOL/L (ref 20–31)
CREAT SERPL-MCNC: 0.45 MG/DL (ref 0.5–0.9)
DIFFERENTIAL TYPE: YES
EOSINOPHILS RELATIVE PERCENT: 1 % (ref 0–5)
GFR AFRICAN AMERICAN: >60 ML/MIN
GFR NON-AFRICAN AMERICAN: >60 ML/MIN
GFR SERPL CREATININE-BSD FRML MDRD: ABNORMAL ML/MIN/{1.73_M2}
GFR SERPL CREATININE-BSD FRML MDRD: ABNORMAL ML/MIN/{1.73_M2}
GLOBULIN: ABNORMAL G/DL (ref 1.5–3.8)
GLUCOSE BLD-MCNC: 90 MG/DL (ref 70–99)
HCT VFR BLD CALC: 46.4 % (ref 36–46)
HEMOGLOBIN: 15.3 G/DL (ref 12–16)
IMMATURE GRANULOCYTES: ABNORMAL %
LYMPHOCYTES # BLD: 19 % (ref 15–40)
MCH RBC QN AUTO: 30.2 PG (ref 26–34)
MCHC RBC AUTO-ENTMCNC: 32.9 G/DL (ref 31–37)
MCV RBC AUTO: 91.7 FL (ref 80–100)
MONOCYTES # BLD: 5 % (ref 4–8)
NRBC AUTOMATED: ABNORMAL PER 100 WBC
PDW BLD-RTO: 14.1 % (ref 12.1–15.2)
PHENOBARBITAL DATE LAST DOSE: ABNORMAL
PHENOBARBITAL DOSE AMOUNT: ABNORMAL
PHENOBARBITAL TIME LAST DOSE: ABNORMAL
PHENOBARBITAL: 53.1 UG/ML (ref 15–40)
PLATELET # BLD: 196 K/UL (ref 140–450)
PLATELET ESTIMATE: ABNORMAL
PMV BLD AUTO: ABNORMAL FL (ref 6–12)
POTASSIUM SERPL-SCNC: 4.2 MMOL/L (ref 3.7–5.3)
RBC # BLD: 5.06 M/UL (ref 4–5.2)
RBC # BLD: ABNORMAL 10*6/UL
SEG NEUTROPHILS: 75 % (ref 47–75)
SEGMENTED NEUTROPHILS ABSOLUTE COUNT: 4.3 K/UL (ref 2.5–7)
SODIUM BLD-SCNC: 139 MMOL/L (ref 135–144)
TOTAL PROTEIN: 7.8 G/DL (ref 6.4–8.3)
WBC # BLD: 5.7 K/UL (ref 3.5–11)
WBC # BLD: ABNORMAL 10*3/UL

## 2021-12-10 PROCEDURE — 36415 COLL VENOUS BLD VENIPUNCTURE: CPT

## 2021-12-10 PROCEDURE — 80184 ASSAY OF PHENOBARBITAL: CPT

## 2021-12-10 PROCEDURE — 80076 HEPATIC FUNCTION PANEL: CPT

## 2021-12-10 PROCEDURE — 80048 BASIC METABOLIC PNL TOTAL CA: CPT

## 2021-12-10 PROCEDURE — 85025 COMPLETE CBC W/AUTO DIFF WBC: CPT

## 2023-01-26 ENCOUNTER — HOSPITAL ENCOUNTER (EMERGENCY)
Age: 33
Discharge: HOME | End: 2023-01-27
Attending: EMERGENCY MEDICINE
Payer: COMMERCIAL

## 2023-01-26 ENCOUNTER — APPOINTMENT (OUTPATIENT)
Dept: GENERAL RADIOLOGY | Age: 33
End: 2023-01-26
Payer: COMMERCIAL

## 2023-01-26 DIAGNOSIS — R55 FAINTING SPELL: Primary | ICD-10-CM

## 2023-01-26 DIAGNOSIS — K59.00 CONSTIPATION, UNSPECIFIED CONSTIPATION TYPE: ICD-10-CM

## 2023-01-26 LAB
ABSOLUTE EOS #: 0 K/UL (ref 0–0.4)
ABSOLUTE LYMPH #: 0.5 K/UL (ref 1–4.8)
ABSOLUTE MONO #: 0.2 K/UL (ref 0–1)
ALBUMIN SERPL-MCNC: 4.6 G/DL (ref 3.5–5.2)
ALP BLD-CCNC: 122 U/L (ref 35–104)
ALT SERPL-CCNC: 15 U/L (ref 5–33)
ANION GAP SERPL CALCULATED.3IONS-SCNC: 17 MMOL/L (ref 9–17)
AST SERPL-CCNC: 17 U/L
BASOPHILS # BLD: 0 % (ref 0–2)
BASOPHILS ABSOLUTE: 0 K/UL (ref 0–0.2)
BILIRUB SERPL-MCNC: 0.2 MG/DL (ref 0.3–1.2)
BUN BLDV-MCNC: 4 MG/DL (ref 6–20)
BUN/CREAT BLD: 8 (ref 9–20)
CALCIUM SERPL-MCNC: 9.4 MG/DL (ref 8.6–10.4)
CHLORIDE BLD-SCNC: 99 MMOL/L (ref 98–107)
CO2: 24 MMOL/L (ref 20–31)
CREAT SERPL-MCNC: 0.49 MG/DL (ref 0.5–0.9)
DIFFERENTIAL TYPE: YES
EOSINOPHILS RELATIVE PERCENT: 0 % (ref 0–5)
GFR SERPL CREATININE-BSD FRML MDRD: >60 ML/MIN/1.73M2
GLUCOSE BLD-MCNC: 184 MG/DL (ref 70–99)
HCT VFR BLD CALC: 45.9 % (ref 36–46)
HEMOGLOBIN: 14.9 G/DL (ref 12–16)
LYMPHOCYTES # BLD: 8 % (ref 15–40)
MCH RBC QN AUTO: 30.4 PG (ref 26–34)
MCHC RBC AUTO-ENTMCNC: 32.5 G/DL (ref 31–37)
MCV RBC AUTO: 93.5 FL (ref 80–100)
MONOCYTES # BLD: 3 % (ref 4–8)
PDW BLD-RTO: 14.9 % (ref 12.1–15.2)
PLATELET # BLD: 231 K/UL (ref 140–450)
POTASSIUM SERPL-SCNC: 3.7 MMOL/L (ref 3.7–5.3)
RBC # BLD: 4.91 M/UL (ref 4–5.2)
SEG NEUTROPHILS: 89 % (ref 47–75)
SEGMENTED NEUTROPHILS ABSOLUTE COUNT: 5.7 K/UL (ref 2.5–7)
SODIUM BLD-SCNC: 140 MMOL/L (ref 135–144)
TOTAL PROTEIN: 7.7 G/DL (ref 6.4–8.3)
WBC # BLD: 6.4 K/UL (ref 3.5–11)

## 2023-01-26 PROCEDURE — 85025 COMPLETE CBC W/AUTO DIFF WBC: CPT

## 2023-01-26 PROCEDURE — 71045 X-RAY EXAM CHEST 1 VIEW: CPT

## 2023-01-26 PROCEDURE — 2580000003 HC RX 258: Performed by: EMERGENCY MEDICINE

## 2023-01-26 PROCEDURE — 99284 EMERGENCY DEPT VISIT MOD MDM: CPT

## 2023-01-26 PROCEDURE — 80053 COMPREHEN METABOLIC PANEL: CPT

## 2023-01-26 RX ORDER — SODIUM CHLORIDE 9 MG/ML
INJECTION, SOLUTION INTRAVENOUS CONTINUOUS
Status: DISCONTINUED | OUTPATIENT
Start: 2023-01-26 | End: 2023-01-27 | Stop reason: HOSPADM

## 2023-01-26 RX ORDER — 0.9 % SODIUM CHLORIDE 0.9 %
500 INTRAVENOUS SOLUTION INTRAVENOUS ONCE
Status: COMPLETED | OUTPATIENT
Start: 2023-01-26 | End: 2023-01-27

## 2023-01-26 RX ADMIN — SODIUM CHLORIDE 500 ML: 9 INJECTION, SOLUTION INTRAVENOUS at 23:19

## 2023-01-26 ASSESSMENT — PAIN - FUNCTIONAL ASSESSMENT: PAIN_FUNCTIONAL_ASSESSMENT: NONE - DENIES PAIN

## 2023-01-27 ENCOUNTER — ANESTHESIA EVENT (OUTPATIENT)
Dept: OPERATING ROOM | Age: 33
End: 2023-01-27
Payer: COMMERCIAL

## 2023-01-27 ENCOUNTER — APPOINTMENT (OUTPATIENT)
Dept: CT IMAGING | Age: 33
DRG: 022 | End: 2023-01-27
Attending: PSYCHIATRY & NEUROLOGY
Payer: COMMERCIAL

## 2023-01-27 ENCOUNTER — APPOINTMENT (OUTPATIENT)
Dept: CT IMAGING | Age: 33
End: 2023-01-27
Payer: COMMERCIAL

## 2023-01-27 ENCOUNTER — ANESTHESIA (OUTPATIENT)
Dept: OPERATING ROOM | Age: 33
End: 2023-01-27
Payer: COMMERCIAL

## 2023-01-27 ENCOUNTER — APPOINTMENT (OUTPATIENT)
Dept: GENERAL RADIOLOGY | Age: 33
DRG: 022 | End: 2023-01-27
Attending: PSYCHIATRY & NEUROLOGY
Payer: COMMERCIAL

## 2023-01-27 ENCOUNTER — HOSPITAL ENCOUNTER (EMERGENCY)
Age: 33
Discharge: ANOTHER HOSPITAL | End: 2023-01-27
Attending: EMERGENCY MEDICINE
Payer: COMMERCIAL

## 2023-01-27 ENCOUNTER — HOSPITAL ENCOUNTER (INPATIENT)
Age: 33
LOS: 2 days | Discharge: HOME | DRG: 022 | End: 2023-01-29
Attending: PSYCHIATRY & NEUROLOGY | Admitting: PSYCHIATRY & NEUROLOGY
Payer: COMMERCIAL

## 2023-01-27 VITALS
HEART RATE: 86 BPM | BODY MASS INDEX: 12.75 KG/M2 | TEMPERATURE: 98.9 F | RESPIRATION RATE: 16 BRPM | DIASTOLIC BLOOD PRESSURE: 65 MMHG | SYSTOLIC BLOOD PRESSURE: 101 MMHG | WEIGHT: 61 LBS | OXYGEN SATURATION: 97 %

## 2023-01-27 VITALS
SYSTOLIC BLOOD PRESSURE: 97 MMHG | HEART RATE: 67 BPM | WEIGHT: 61.4 LBS | DIASTOLIC BLOOD PRESSURE: 59 MMHG | RESPIRATION RATE: 17 BRPM | OXYGEN SATURATION: 97 % | HEIGHT: 58 IN | BODY MASS INDEX: 12.89 KG/M2

## 2023-01-27 DIAGNOSIS — T85.09XA OBSTRUCTED VENTRICULOPERITONEAL SHUNT, INITIAL ENCOUNTER (HCC): ICD-10-CM

## 2023-01-27 DIAGNOSIS — G91.9 HYDROCEPHALUS, UNSPECIFIED TYPE (HCC): ICD-10-CM

## 2023-01-27 DIAGNOSIS — G91.1 OBSTRUCTIVE HYDROCEPHALUS (HCC): ICD-10-CM

## 2023-01-27 DIAGNOSIS — Z86.69 HISTORY OF CEREBRAL PALSY: ICD-10-CM

## 2023-01-27 DIAGNOSIS — R41.89 UNRESPONSIVE STATE: Primary | ICD-10-CM

## 2023-01-27 LAB
-: ABNORMAL
ABO/RH: NORMAL
ABSOLUTE EOS #: 0 K/UL (ref 0–0.4)
ABSOLUTE LYMPH #: 0.7 K/UL (ref 1–4.8)
ABSOLUTE MONO #: 0.4 K/UL (ref 0–1)
ALBUMIN SERPL-MCNC: 4.1 G/DL (ref 3.5–5.2)
ALP BLD-CCNC: 110 U/L (ref 35–104)
ALT SERPL-CCNC: 13 U/L (ref 5–33)
ANION GAP SERPL CALCULATED.3IONS-SCNC: 16 MMOL/L (ref 9–17)
ANTIBODY SCREEN: NEGATIVE
ARM BAND NUMBER: NORMAL
AST SERPL-CCNC: 15 U/L
BACTERIA: ABNORMAL
BASOPHILS # BLD: 0 % (ref 0–2)
BASOPHILS ABSOLUTE: 0 K/UL (ref 0–0.2)
BILIRUB SERPL-MCNC: 0.2 MG/DL (ref 0.3–1.2)
BILIRUBIN URINE: NEGATIVE
BILIRUBIN URINE: NEGATIVE
BUN BLDV-MCNC: 4 MG/DL (ref 6–20)
BUN/CREAT BLD: ABNORMAL (ref 9–20)
CALCIUM SERPL-MCNC: 9 MG/DL (ref 8.6–10.4)
CHLORIDE BLD-SCNC: 103 MMOL/L (ref 98–107)
CO2: 22 MMOL/L (ref 20–31)
COLOR: YELLOW
COLOR: YELLOW
COMMENT UA: ABNORMAL
CREAT SERPL-MCNC: <0.4 MG/DL (ref 0.5–0.9)
EOSINOPHILS RELATIVE PERCENT: 0 % (ref 0–5)
EPITHELIAL CELLS UA: ABNORMAL /HPF
EPITHELIAL CELLS UA: ABNORMAL /HPF (ref 0–5)
EXPIRATION DATE: NORMAL
FIBRINOGEN: 171 MG/DL (ref 140–420)
GFR SERPL CREATININE-BSD FRML MDRD: ABNORMAL ML/MIN/1.73M2
GLUCOSE BLD-MCNC: 119 MG/DL (ref 70–99)
GLUCOSE BLD-MCNC: 95 MG/DL (ref 65–105)
GLUCOSE URINE: NEGATIVE
GLUCOSE URINE: NEGATIVE
HCT VFR BLD CALC: 44.8 % (ref 36–46)
HEMOGLOBIN: 14.6 G/DL (ref 12–16)
INR BLD: 1.2
KETONES, URINE: ABNORMAL
KETONES, URINE: ABNORMAL
LEUKOCYTE ESTERASE, URINE: NEGATIVE
LEUKOCYTE ESTERASE, URINE: NEGATIVE
LYMPHOCYTES # BLD: 8 % (ref 15–40)
MCH RBC QN AUTO: 30.3 PG (ref 26–34)
MCHC RBC AUTO-ENTMCNC: 32.6 G/DL (ref 31–37)
MCV RBC AUTO: 93.1 FL (ref 80–100)
MONOCYTES # BLD: 5 % (ref 4–8)
MORPHOLOGY: ABNORMAL
MORPHOLOGY: ABNORMAL
MUCUS: ABNORMAL
NITRITE, URINE: NEGATIVE
NITRITE, URINE: NEGATIVE
PARTIAL THROMBOPLASTIN TIME: 26.9 SEC (ref 20.5–30.5)
PDW BLD-RTO: 15 % (ref 12.1–15.2)
PH UA: 5 (ref 5–8)
PH UA: 6 (ref 5–8)
PLATELET # BLD: 229 K/UL (ref 140–450)
POTASSIUM SERPL-SCNC: 3.7 MMOL/L (ref 3.7–5.3)
PROTEIN UA: ABNORMAL
PROTEIN UA: ABNORMAL
PROTHROMBIN TIME: 12.4 SEC (ref 9.1–12.3)
RBC # BLD: 4.81 M/UL (ref 4–5.2)
RBC UA: ABNORMAL /HPF (ref 0–2)
RBC UA: ABNORMAL /HPF (ref 0–2)
SEG NEUTROPHILS: 87 % (ref 47–75)
SEGMENTED NEUTROPHILS ABSOLUTE COUNT: 7.4 K/UL (ref 2.5–7)
SODIUM BLD-SCNC: 141 MMOL/L (ref 135–144)
SPECIFIC GRAVITY UA: 1.02 (ref 1–1.03)
SPECIFIC GRAVITY UA: 1.03 (ref 1–1.03)
TOTAL PROTEIN: 7 G/DL (ref 6.4–8.3)
TURBIDITY: CLEAR
TURBIDITY: CLEAR
URINE HGB: ABNORMAL
URINE HGB: ABNORMAL
UROBILINOGEN, URINE: NORMAL
UROBILINOGEN, URINE: NORMAL
WBC # BLD: 8.5 K/UL (ref 3.5–11)
WBC UA: ABNORMAL /HPF
WBC UA: ABNORMAL /HPF (ref 0–5)

## 2023-01-27 PROCEDURE — 85025 COMPLETE CBC W/AUTO DIFF WBC: CPT

## 2023-01-27 PROCEDURE — 80053 COMPREHEN METABOLIC PANEL: CPT

## 2023-01-27 PROCEDURE — 81001 URINALYSIS AUTO W/SCOPE: CPT

## 2023-01-27 PROCEDURE — 2580000003 HC RX 258: Performed by: NURSE ANESTHETIST, CERTIFIED REGISTERED

## 2023-01-27 PROCEDURE — 2580000003 HC RX 258: Performed by: EMERGENCY MEDICINE

## 2023-01-27 PROCEDURE — 70450 CT HEAD/BRAIN W/O DYE: CPT

## 2023-01-27 PROCEDURE — 6370000000 HC RX 637 (ALT 250 FOR IP): Performed by: NEUROLOGICAL SURGERY

## 2023-01-27 PROCEDURE — 2720000010 HC SURG SUPPLY STERILE: Performed by: NEUROLOGICAL SURGERY

## 2023-01-27 PROCEDURE — 6360000002 HC RX W HCPCS: Performed by: EMERGENCY MEDICINE

## 2023-01-27 PROCEDURE — 3600000004 HC SURGERY LEVEL 4 BASE: Performed by: NEUROLOGICAL SURGERY

## 2023-01-27 PROCEDURE — 6360000002 HC RX W HCPCS

## 2023-01-27 PROCEDURE — 85730 THROMBOPLASTIN TIME PARTIAL: CPT

## 2023-01-27 PROCEDURE — 62225 REPLACE/IRRIGATE CATHETER: CPT | Performed by: NEUROLOGICAL SURGERY

## 2023-01-27 PROCEDURE — 7100000001 HC PACU RECOVERY - ADDTL 15 MIN: Performed by: NEUROLOGICAL SURGERY

## 2023-01-27 PROCEDURE — 3700000000 HC ANESTHESIA ATTENDED CARE: Performed by: NEUROLOGICAL SURGERY

## 2023-01-27 PROCEDURE — 00W60JZ REVISION OF SYNTHETIC SUBSTITUTE IN CEREBRAL VENTRICLE, OPEN APPROACH: ICD-10-PCS | Performed by: NEUROLOGICAL SURGERY

## 2023-01-27 PROCEDURE — 62230 REPLACE/REVISE BRAIN SHUNT: CPT | Performed by: NEUROLOGICAL SURGERY

## 2023-01-27 PROCEDURE — 93005 ELECTROCARDIOGRAM TRACING: CPT | Performed by: EMERGENCY MEDICINE

## 2023-01-27 PROCEDURE — 93005 ELECTROCARDIOGRAM TRACING: CPT

## 2023-01-27 PROCEDURE — 6360000002 HC RX W HCPCS: Performed by: NURSE ANESTHETIST, CERTIFIED REGISTERED

## 2023-01-27 PROCEDURE — 86900 BLOOD TYPING SEROLOGIC ABO: CPT

## 2023-01-27 PROCEDURE — 74018 RADEX ABDOMEN 1 VIEW: CPT

## 2023-01-27 PROCEDURE — 2000000000 HC ICU R&B

## 2023-01-27 PROCEDURE — 2580000003 HC RX 258

## 2023-01-27 PROCEDURE — C1713 ANCHOR/SCREW BN/BN,TIS/BN: HCPCS | Performed by: NEUROLOGICAL SURGERY

## 2023-01-27 PROCEDURE — 86901 BLOOD TYPING SEROLOGIC RH(D): CPT

## 2023-01-27 PROCEDURE — 85384 FIBRINOGEN ACTIVITY: CPT

## 2023-01-27 PROCEDURE — 2500000003 HC RX 250 WO HCPCS: Performed by: NEUROLOGICAL SURGERY

## 2023-01-27 PROCEDURE — 2709999900 HC NON-CHARGEABLE SUPPLY: Performed by: NEUROLOGICAL SURGERY

## 2023-01-27 PROCEDURE — 2500000003 HC RX 250 WO HCPCS: Performed by: NURSE ANESTHETIST, CERTIFIED REGISTERED

## 2023-01-27 PROCEDURE — 3600000014 HC SURGERY LEVEL 4 ADDTL 15MIN: Performed by: NEUROLOGICAL SURGERY

## 2023-01-27 PROCEDURE — C1889 IMPLANT/INSERT DEVICE, NOC: HCPCS | Performed by: NEUROLOGICAL SURGERY

## 2023-01-27 PROCEDURE — 7100000000 HC PACU RECOVERY - FIRST 15 MIN: Performed by: NEUROLOGICAL SURGERY

## 2023-01-27 PROCEDURE — 86850 RBC ANTIBODY SCREEN: CPT

## 2023-01-27 PROCEDURE — 3700000001 HC ADD 15 MINUTES (ANESTHESIA): Performed by: NEUROLOGICAL SURGERY

## 2023-01-27 PROCEDURE — 85610 PROTHROMBIN TIME: CPT

## 2023-01-27 PROCEDURE — 2580000003 HC RX 258: Performed by: NEUROLOGICAL SURGERY

## 2023-01-27 PROCEDURE — C1729 CATH, DRAINAGE: HCPCS | Performed by: NEUROLOGICAL SURGERY

## 2023-01-27 PROCEDURE — 82947 ASSAY GLUCOSE BLOOD QUANT: CPT

## 2023-01-27 DEVICE — NEURO SCREWS, CROSS-PIN, SELF-DRILLING: Type: IMPLANTABLE DEVICE | Site: CRANIAL | Status: FUNCTIONAL

## 2023-01-27 RX ORDER — PHENYLEPHRINE HCL IN 0.9% NACL 0.5 MG/5ML
SYRINGE (ML) INTRAVENOUS PRN
Status: DISCONTINUED | OUTPATIENT
Start: 2023-01-27 | End: 2023-01-28 | Stop reason: SDUPTHER

## 2023-01-27 RX ORDER — 0.9 % SODIUM CHLORIDE 0.9 %
500 INTRAVENOUS SOLUTION INTRAVENOUS ONCE
Status: COMPLETED | OUTPATIENT
Start: 2023-01-27 | End: 2023-01-27

## 2023-01-27 RX ORDER — ONDANSETRON 4 MG/1
4 TABLET, ORALLY DISINTEGRATING ORAL EVERY 8 HOURS PRN
Status: DISCONTINUED | OUTPATIENT
Start: 2023-01-27 | End: 2023-01-29 | Stop reason: HOSPADM

## 2023-01-27 RX ORDER — CEFAZOLIN SODIUM 1 G/3ML
INJECTION, POWDER, FOR SOLUTION INTRAMUSCULAR; INTRAVENOUS PRN
Status: DISCONTINUED | OUTPATIENT
Start: 2023-01-27 | End: 2023-01-28 | Stop reason: SDUPTHER

## 2023-01-27 RX ORDER — DEXAMETHASONE SODIUM PHOSPHATE 10 MG/ML
INJECTION, SOLUTION INTRAMUSCULAR; INTRAVENOUS PRN
Status: DISCONTINUED | OUTPATIENT
Start: 2023-01-27 | End: 2023-01-28 | Stop reason: SDUPTHER

## 2023-01-27 RX ORDER — LORAZEPAM 2 MG/ML
1 INJECTION INTRAMUSCULAR ONCE
Status: COMPLETED | OUTPATIENT
Start: 2023-01-27 | End: 2023-01-27

## 2023-01-27 RX ORDER — ACETAMINOPHEN 325 MG/1
650 TABLET ORAL EVERY 6 HOURS PRN
Status: DISCONTINUED | OUTPATIENT
Start: 2023-01-27 | End: 2023-01-28

## 2023-01-27 RX ORDER — ACETAMINOPHEN 650 MG/1
650 SUPPOSITORY RECTAL EVERY 6 HOURS PRN
Status: DISCONTINUED | OUTPATIENT
Start: 2023-01-27 | End: 2023-01-28

## 2023-01-27 RX ORDER — LIDOCAINE HYDROCHLORIDE 10 MG/ML
INJECTION, SOLUTION EPIDURAL; INFILTRATION; INTRACAUDAL; PERINEURAL PRN
Status: DISCONTINUED | OUTPATIENT
Start: 2023-01-27 | End: 2023-01-28 | Stop reason: SDUPTHER

## 2023-01-27 RX ORDER — ROCURONIUM BROMIDE 10 MG/ML
INJECTION, SOLUTION INTRAVENOUS PRN
Status: DISCONTINUED | OUTPATIENT
Start: 2023-01-27 | End: 2023-01-28 | Stop reason: SDUPTHER

## 2023-01-27 RX ORDER — ONDANSETRON 2 MG/ML
4 INJECTION INTRAMUSCULAR; INTRAVENOUS EVERY 6 HOURS PRN
Status: DISCONTINUED | OUTPATIENT
Start: 2023-01-27 | End: 2023-01-29 | Stop reason: HOSPADM

## 2023-01-27 RX ORDER — MANNITOL 250 MG/ML
25 INJECTION, SOLUTION INTRAVENOUS ONCE
Status: COMPLETED | OUTPATIENT
Start: 2023-01-27 | End: 2023-01-27

## 2023-01-27 RX ORDER — SODIUM CHLORIDE 0.9 % (FLUSH) 0.9 %
5-40 SYRINGE (ML) INJECTION PRN
Status: DISCONTINUED | OUTPATIENT
Start: 2023-01-27 | End: 2023-01-29 | Stop reason: HOSPADM

## 2023-01-27 RX ORDER — SODIUM CHLORIDE, SODIUM LACTATE, POTASSIUM CHLORIDE, CALCIUM CHLORIDE 600; 310; 30; 20 MG/100ML; MG/100ML; MG/100ML; MG/100ML
INJECTION, SOLUTION INTRAVENOUS CONTINUOUS PRN
Status: DISCONTINUED | OUTPATIENT
Start: 2023-01-27 | End: 2023-01-28 | Stop reason: SDUPTHER

## 2023-01-27 RX ORDER — POLYETHYLENE GLYCOL 3350 17 G/17G
17 POWDER, FOR SOLUTION ORAL DAILY PRN
Status: DISCONTINUED | OUTPATIENT
Start: 2023-01-27 | End: 2023-01-29 | Stop reason: HOSPADM

## 2023-01-27 RX ORDER — SODIUM CHLORIDE 0.9 % (FLUSH) 0.9 %
5-40 SYRINGE (ML) INJECTION EVERY 12 HOURS SCHEDULED
Status: DISCONTINUED | OUTPATIENT
Start: 2023-01-27 | End: 2023-01-29 | Stop reason: HOSPADM

## 2023-01-27 RX ORDER — LORAZEPAM 2 MG/ML
1 INJECTION INTRAMUSCULAR EVERY 5 MIN PRN
Status: DISCONTINUED | OUTPATIENT
Start: 2023-01-27 | End: 2023-01-29 | Stop reason: HOSPADM

## 2023-01-27 RX ORDER — SODIUM CHLORIDE 9 MG/ML
INJECTION, SOLUTION INTRAVENOUS CONTINUOUS
Status: DISCONTINUED | OUTPATIENT
Start: 2023-01-27 | End: 2023-01-29 | Stop reason: HOSPADM

## 2023-01-27 RX ORDER — PROPOFOL 10 MG/ML
INJECTION, EMULSION INTRAVENOUS PRN
Status: DISCONTINUED | OUTPATIENT
Start: 2023-01-27 | End: 2023-01-28 | Stop reason: SDUPTHER

## 2023-01-27 RX ORDER — FENTANYL CITRATE 50 UG/ML
INJECTION, SOLUTION INTRAMUSCULAR; INTRAVENOUS PRN
Status: DISCONTINUED | OUTPATIENT
Start: 2023-01-27 | End: 2023-01-28 | Stop reason: SDUPTHER

## 2023-01-27 RX ORDER — SODIUM CHLORIDE 9 MG/ML
INJECTION, SOLUTION INTRAVENOUS PRN
Status: DISCONTINUED | OUTPATIENT
Start: 2023-01-27 | End: 2023-01-29 | Stop reason: HOSPADM

## 2023-01-27 RX ORDER — ONDANSETRON 2 MG/ML
INJECTION INTRAMUSCULAR; INTRAVENOUS PRN
Status: DISCONTINUED | OUTPATIENT
Start: 2023-01-27 | End: 2023-01-28 | Stop reason: SDUPTHER

## 2023-01-27 RX ORDER — SODIUM CHLORIDE 9 MG/ML
INJECTION, SOLUTION INTRAVENOUS CONTINUOUS PRN
Status: DISCONTINUED | OUTPATIENT
Start: 2023-01-27 | End: 2023-01-28 | Stop reason: SDUPTHER

## 2023-01-27 RX ADMIN — SODIUM CHLORIDE: 9 INJECTION, SOLUTION INTRAVENOUS at 21:53

## 2023-01-27 RX ADMIN — DEXAMETHASONE SODIUM PHOSPHATE 10 MG: 10 INJECTION INTRAMUSCULAR; INTRAVENOUS at 23:22

## 2023-01-27 RX ADMIN — FENTANYL CITRATE 25 MCG: 0.05 INJECTION, SOLUTION INTRAMUSCULAR; INTRAVENOUS at 22:25

## 2023-01-27 RX ADMIN — CEFAZOLIN 2 G: 1 INJECTION, POWDER, FOR SOLUTION INTRAMUSCULAR; INTRAVENOUS at 23:19

## 2023-01-27 RX ADMIN — SODIUM CHLORIDE 500 ML: 9 INJECTION, SOLUTION INTRAVENOUS at 11:50

## 2023-01-27 RX ADMIN — LIDOCAINE HYDROCHLORIDE 30 MG: 10 INJECTION, SOLUTION EPIDURAL; INFILTRATION; INTRACAUDAL; PERINEURAL at 22:25

## 2023-01-27 RX ADMIN — FENTANYL CITRATE 25 MCG: 0.05 INJECTION, SOLUTION INTRAMUSCULAR; INTRAVENOUS at 23:35

## 2023-01-27 RX ADMIN — ROCURONIUM BROMIDE 40 MG: 10 INJECTION, SOLUTION INTRAVENOUS at 22:25

## 2023-01-27 RX ADMIN — SODIUM CHLORIDE, PRESERVATIVE FREE 10 ML: 5 INJECTION INTRAVENOUS at 21:00

## 2023-01-27 RX ADMIN — MANNITOL 25 G: 12.5 INJECTION, SOLUTION INTRAVENOUS at 20:37

## 2023-01-27 RX ADMIN — ONDANSETRON 4 MG: 2 INJECTION INTRAMUSCULAR; INTRAVENOUS at 22:22

## 2023-01-27 RX ADMIN — SODIUM CHLORIDE, POTASSIUM CHLORIDE, SODIUM LACTATE AND CALCIUM CHLORIDE: 600; 310; 30; 20 INJECTION, SOLUTION INTRAVENOUS at 22:20

## 2023-01-27 RX ADMIN — LORAZEPAM 1 MG: 2 INJECTION, SOLUTION INTRAMUSCULAR; INTRAVENOUS at 12:09

## 2023-01-27 RX ADMIN — Medication 50 MCG: at 23:21

## 2023-01-27 RX ADMIN — PROPOFOL 20 MG: 10 INJECTION, EMULSION INTRAVENOUS at 22:25

## 2023-01-27 RX ADMIN — SODIUM CHLORIDE: 9 INJECTION, SOLUTION INTRAVENOUS at 22:20

## 2023-01-27 RX ADMIN — SODIUM CHLORIDE: 9 INJECTION, SOLUTION INTRAVENOUS at 00:50

## 2023-01-27 ASSESSMENT — LIFESTYLE VARIABLES: HOW OFTEN DO YOU HAVE A DRINK CONTAINING ALCOHOL: NEVER

## 2023-01-27 NOTE — ED NOTES
Ticket to ride completed.  The following information was reported off:  Name  Allergies  Orientation Level  Destination  Safety Issues  Code Status  Oxygen Requirements  Special needs including mobility, language, communication         Thania Mckinnon RN  01/27/23 6364

## 2023-01-27 NOTE — ED PROVIDER NOTES
[address] COMPLAINT    Chief Complaint   Patient presents with    Seizures     Pt would not wake up for family this am and only responsive to painful stimuli       HPI    Dallas Gallegos is a 28 y.o. female who presentsto ED by private vehicle. Patient was brought to ED by her father. Father states that she has been unresponsive since this morning. Patient has history of severe cerebral palsy. Patient is bedbound. Patient was seen yesterday for for constipation. During yesterday's visit patient was coherent, following a conversation. Since this morning patient has been unresponsive unable to arouse. Patient has prior history of  shunt placed when she was about 6years old. PAST MEDICAL HISTORY    Past Medical History:   Diagnosis Date    Brain bleed ([address]     past history - at age15    Chronic bilateral low back pain without sciatica 12/8/2016    History of closed head injury     dad also states there was \"blood on her brain\" with this & occassional bleeds into shunt    Infantile cerebral palsy ([address]     Kidney stone     Seizures ([address]        SURGICAL HISTORY    Past Surgical History:   Procedure Laterality Date    BRAIN SURGERY      shunts x 2    EYE SURGERY      x2    LITHOTRIPSY Right 08/18/2015    With Stent Removal       CURRENT MEDICATIONS    Current Outpatient Rx   Medication Sig Dispense Refill    polyethylene glycol (GAVILAX) 17 GM/SCOOP powder mix 17 grams with liquid and drink BY MOUTH daily 510 g 1    Diapers & Supplies MISC (Adult medium)  Use daily as needed. 160 each 1    APTIOM 600 MG TABS tablet Take 600 mg by mouth daily      Diapers & Supplies MISC 1 each by Does not apply route daily as needed (as needed) Adult  each 3    QUEtiapine (SEROQUEL) 25 MG tablet take 1/2 tablet by mouth at bedtime  1    PERIOGARD 0.12 % solution Rinse with 1/2 ounce TWICE DAILY  1    Misc.  Devices ([address]) [address] [facility] Gait  with wheels, square with seat sling seat and braces 1 each 0    naproxen sodium (ANAPROX) 550 MG tablet   3    Ankle Foot Arthosis MISC by Does not apply route. Bilateral AFO brace 1 each 0    phenobarbital (LUMINAL) 32.4 MG tablet Take 2 tabs every morning, and 3 tabs at bedtime          ALLERGIES    No Known Allergies    FAMILY HISTORY    History reviewed. No pertinent family history. SOCIAL HISTORY    Social History     Socioeconomic History    Marital status: Single     Spouse name: None    Number of children: None    Years of education: None    Highest education level: None   Tobacco Use    Smoking status: Never    Smokeless tobacco: Never   Vaping Use    Vaping Use: Never used   Substance and Sexual Activity    Alcohol use: No    Drug use: Never           Review of Systems:  Constitutional: Unable to obtain due to patient's mental status. PHYSICAL EXAM    VITAL SIGNS: /65   Pulse 86   Temp 98.9 °F (37.2 °C) (Axillary)   Resp 16   Wt 61 lb (27.7 kg)   SpO2 97%   BMI 12.75 kg/m²    Constitutional: Patient was brought to ED by her father. Patient has history of cerebral palsy. Patient is unresponsive  HENT:  Normocephalic, Atraumatic, Bilateral external ears normal, Oropharynx moist, No oral exudates, Nose normal. Neck- Normal range of motion, No tenderness, Supple, No stridor. Eyes:  PERRL, EOMI, Conjunctiva normal, No discharge. Respiratory:  Normal breath sounds, No respiratory distress, No wheezing, No chest tenderness. Cardiovascular:  Normal heart rate, Normal rhythm, No murmurs, No rubs, No gallops. GI:  Bowel sounds normal, Soft, No tenderness, No masses, No pulsatile masses. : External genitalia appear normal, No masses or lesions. No discharge. No CVA tenderness. Musculoskeletal: History of cerebral palsy. Lower and upper extremity muscle contractures  Integument:  Warm, Dry, No erythema, No rash. Lymphatic:  No lymphadenopathy noted.    Neurologic: Patient is not responding to verbal stimuli patient. withdraws to painful stimuli   Positive corneal and gag reflex. Patient is protecting airway. psychiatric:  Affect normal, Judgment normal, Mood normal.     EKG        RADIOLOGY    CT HEAD WO CONTRAST   Final Result      Shunt malfunction with marked hydrocephalus. Fourth ventricle normal size. Third and lateral ventricles are dilated. The shunt appears to be disconnected in the right occipital scalp/upper neck. The findings were discussed with Dr. Sophia Horton at 1300 hours. PROCEDURES    Procedures    Labs  Labs Reviewed   CBC WITH AUTO DIFFERENTIAL - Abnormal; Notable for the following components:       Result Value    Seg Neutrophils 87 (*)     Lymphocytes 8 (*)     Segs Absolute 7.40 (*)     Absolute Lymph # 0.70 (*)     All other components within normal limits   COMPREHENSIVE METABOLIC PANEL - Abnormal; Notable for the following components:    Glucose 119 (*)     BUN 4 (*)     Creatinine <0.40 (*)     Alkaline Phosphatase 110 (*)     Total Bilirubin 0.2 (*)     All other components within normal limits       MIPS  Not applicable      Total Critical Care time was:    EMERGENCY DEPARTMENT COURSE and DIFFERENTIAL DIAGNOSIS/MDM:    Patient Course:     Number and complexity of problems:    Differential Diagnosis: Patient presents with unresponsive state. Patient has history of cerebral palsy and seizures. Patient diagnosed include nonconvulsive status ellipticals  Pertinent Comorbid Conditions: Patient has history of cerebral palsy    2)  Data Reviewed      Labs/tests: Labs were reviewed and discussed with the family    EKG/rhythm strips: EKG shows sinus rhythm no acute ST change    Radiology interpretation/review: CT shows severe hydrocephalus          3)  Treatment and Disposition    Patient repeat assessment: Repeat assessment patient continues to be unresponsive with corneal and gag reflex.   No emesis    Disposition discussion with patient/family: She is discussed with the surgeon on-call. The CT findings were discussed with the father    Case discussed with consulting clinician: Patient is discussed with  Dr. Karli Estrada, and , neurosurgeon on-call for [facility] and accepted for transfer. ED Medications administered this visit:    Medications   0.9 % sodium chloride bolus (0 mLs IntraVENous Stopped 1/27/23 1339)   LORazepam (ATIVAN) injection 1 mg (1 mg IntraVENous Given 1/27/23 1209)       New Prescriptions from this visit:    New Prescriptions    No medications on file       Follow-up:  No follow-up provider specified. Final Impression:   1. Unresponsive state    2. Obstructive hydrocephalus ([address]    3. History of cerebral palsy    4.  Obstructed ventriculoperitoneal shunt, initial encounter [facility])               (Please note that portions of this note were completed with a voice recognition program.  Efforts were made to edit the dictations but occasionally words are mis-transcribed.)          Sirena Zurita MD  01/28/23 5015

## 2023-01-27 NOTE — ED PROVIDER NOTES
[facility] ED  eMERGENCY dEPARTMENT eNCOUnter      Pt Name: Idania Mireles  MRN: [id number]  [place] 1990  Date of evaluation: 1/26/2023  Provider: Juanito Jauregui MD    CHIEF COMPLAINT       Chief Complaint   Patient presents with    Other     Patient came into ED tonight d/t not being responsive to family. Patient is a 80-year-old female who presents to the emergency department with altered mental status. Patient has a history of cerebral palsy, brain bleed at age 15, seizures, and chronic back pain. Dad carried her in and states that tonight she seemed to pass out from her constipation which is happened before but just would not wake up as much is normal and so he was concerned and brought her in. He denies any other history. He states she has been taking her medicines. Patient is really not giving us any history. At one point patient did open her eyes and talk to him and say she did not want her meds but states she does not feel good and does not know why. Nursing Notes were reviewed. REVIEW OF SYSTEMS    (2-9 systems for level 4, 10 or more for level 5)     Review of Systems   Unable to perform ROS: Mental status change     Except as noted above the remainder of the review of systems was reviewed and negative.        PAST MEDICAL HISTORY     Past Medical History:   Diagnosis Date    Brain bleed ([address]     past history - at age13    Chronic bilateral low back pain without sciatica 12/8/2016    History of closed head injury     dad also states there was \"blood on her brain\" with this & occassional bleeds into shunt    Infantile cerebral palsy ([address]     Kidney stone     Seizures ([address]          SURGICAL HISTORY       Past Surgical History:   Procedure Laterality Date    BRAIN SURGERY      shunts x 2    EYE SURGERY      x2    LITHOTRIPSY Right 08/18/2015    With Stent Removal    VENTRICULOPERITONEAL SHUNT Right 01/28/2023    Revision         ALLERGIES     Patient has no known allergies. FAMILY HISTORY     No family history on file. SOCIAL HISTORY       Social History     Socioeconomic History    Marital status: Single   Tobacco Use    Smoking status: Never    Smokeless tobacco: Never   Vaping Use    Vaping Use: Never used   Substance and Sexual Activity    Alcohol use: No    Drug use: Never           PHYSICAL EXAM    (up to 7 for level 4, 8 ormore for level 5)     ED Triage Vitals [01/26/23 2310]   BP Temp Temp src Heart Rate Resp SpO2 Height Weight   128/79 -- -- 88 20 100 % 4' 10\" (1.473 m) 61 lb 6.4 oz (27.9 kg)       Physical Exam    Physical    Vital signs and nursing notes were reviewed as well as the social, family, and past medical history. Gen. appearance: Patient is lethargic. Head: Atraumatic, normocephalic    Neck: Supple, trachea/thyroid normal    EENT: PERRLA, EOMI, conjunctiva normal.    Skin: Warm and dry with no rash    Cardiovascular: Heart RRR, no gallops or rubs, no aortic enlargement or bruits noted. Respiratory: Lungs clear, no wheezing, no rales, normal breath sounds. Gastrointestinal: Abdomen nontender, bowel sounds are normal    Musculoskeletal: No tenderness in the extremities, no back or hip pain.   Patient does have some contractures    Neurological: Patient is alert and oriented and conversant with her dad, no focal motor or sensory deficits noted,      DIAGNOSTIC RESULTS             LABS:  Labs Reviewed   CBC WITH AUTO DIFFERENTIAL - Abnormal; Notable for the following components:       Result Value    Seg Neutrophils 89 (*)     Lymphocytes 8 (*)     Monocytes 3 (*)     Absolute Lymph # 0.50 (*)     All other components within normal limits   COMPREHENSIVE METABOLIC PANEL - Abnormal; Notable for the following components:    Glucose 184 (*)     BUN 4 (*)     Creatinine 0.49 (*)     Bun/Cre Ratio 8 (*)     Alkaline Phosphatase 122 (*)     Total Bilirubin 0.2 (*)     All other components within normal limits   URINALYSIS - Abnormal; Notable for the following components:    Ketones, Urine MODERATE (*)     Urine Hgb TRACE (*)     Protein, UA 1+ (*)     All other components within normal limits   MICROSCOPIC URINALYSIS - Abnormal; Notable for the following components:    Mucus, UA RARE (*)     All other components within normal limits       All other labs were within normal range or not returned as of this dictation. EMERGENCY DEPARTMENT COURSE and DIFFERENTIAL DIAGNOSIS/MDM:   Vitals:    Vitals:    01/27/23 0058 01/27/23 0108 01/27/23 0118 01/27/23 0128   BP: (!) 116/102  99/71 (!) 97/59   Pulse: 82 69 65 67   Resp: 14 16 24 17   SpO2: 100% (!) 88% 98% 97%   Weight:       Height:                     REASSESSMENT      Here in the ED patient's work-up and labs were unremarkable with no sign of infection and no other acute abnormalities. Dad states patient seems back to normal now and patient is denying symptoms. Per dad he would prefer to take her home at this point and have her sleep and then follow-up with her doctor tomorrow and return if any worsening. As she does not have any further symptoms I believe patient can be discharged home and we will discharged to follow-up. PROCEDURES:  Unless otherwise noted below, none     Procedures    FINAL IMPRESSION      1. Fainting spell    2.  Constipation, unspecified constipation type          DISPOSITION/PLAN   DISPOSITION Decision To Discharge 01/27/2023 01:47:45 AM      PATIENT REFERRED TO:  [facility] [id number] [phone]    In 2 days      DISCHARGE MEDICATIONS:  Discharge Medication List as of 1/27/2023  1:50 AM             (Please note that portions ofthis note were completed with a voice recognition program.  Efforts were made to edit the dictations but occasionally words are mis-transcribed.)    Nitish Olmos MD(electronically signed)  Attending Emergency Physician            Nitish Olmos MD  01/27/23 0150       Nitish Olmos MD  01/29/23 7641

## 2023-01-28 ENCOUNTER — APPOINTMENT (OUTPATIENT)
Dept: CT IMAGING | Age: 33
DRG: 022 | End: 2023-01-28
Attending: PSYCHIATRY & NEUROLOGY
Payer: COMMERCIAL

## 2023-01-28 ENCOUNTER — APPOINTMENT (OUTPATIENT)
Dept: GENERAL RADIOLOGY | Age: 33
DRG: 022 | End: 2023-01-28
Attending: PSYCHIATRY & NEUROLOGY
Payer: COMMERCIAL

## 2023-01-28 LAB
ABSOLUTE EOS #: 0.13 K/UL (ref 0–0.44)
ABSOLUTE IMMATURE GRANULOCYTE: 0.29 K/UL (ref 0–0.3)
ABSOLUTE LYMPH #: 1.28 K/UL (ref 1.1–3.7)
ABSOLUTE MONO #: 0.99 K/UL (ref 0.1–1.2)
BASOPHILS # BLD: 1 % (ref 0–2)
BASOPHILS ABSOLUTE: 0.13 K/UL (ref 0–0.2)
EKG ATRIAL RATE: 60 BPM
EKG ATRIAL RATE: 89 BPM
EKG P AXIS: 67 DEGREES
EKG P AXIS: 74 DEGREES
EKG P-R INTERVAL: 130 MS
EKG P-R INTERVAL: 138 MS
EKG Q-T INTERVAL: 416 MS
EKG Q-T INTERVAL: 462 MS
EKG QRS DURATION: 76 MS
EKG QRS DURATION: 80 MS
EKG QTC CALCULATION (BAZETT): 462 MS
EKG QTC CALCULATION (BAZETT): 506 MS
EKG R AXIS: 59 DEGREES
EKG R AXIS: 63 DEGREES
EKG T AXIS: 57 DEGREES
EKG T AXIS: 62 DEGREES
EKG VENTRICULAR RATE: 60 BPM
EKG VENTRICULAR RATE: 89 BPM
EOSINOPHILS RELATIVE PERCENT: 1 % (ref 1–4)
HCT VFR BLD CALC: 38.6 % (ref 36.3–47.1)
HEMOGLOBIN: 12.5 G/DL (ref 11.9–15.1)
IMMATURE GRANULOCYTES: 2 %
LYMPHOCYTES # BLD: 9 % (ref 24–43)
MCH RBC QN AUTO: 31.1 PG (ref 25.2–33.5)
MCHC RBC AUTO-ENTMCNC: 32.4 G/DL (ref 28.4–34.8)
MCV RBC AUTO: 96 FL (ref 82.6–102.9)
MONOCYTES # BLD: 7 % (ref 3–12)
NRBC AUTOMATED: 0 PER 100 WBC
PDW BLD-RTO: 14.9 % (ref 11.8–14.4)
PLATELET # BLD: ABNORMAL K/UL (ref 138–453)
PLATELET, FLUORESCENCE: 176 K/UL (ref 138–453)
PLATELET, IMMATURE FRACTION: 9.8 % (ref 1.1–10.3)
RBC # BLD: 4.02 M/UL (ref 3.95–5.11)
RBC # BLD: ABNORMAL 10*6/UL
SEG NEUTROPHILS: 81 % (ref 36–65)
SEGMENTED NEUTROPHILS ABSOLUTE COUNT: 12.13 K/UL (ref 1.5–8.1)
WBC # BLD: 15 K/UL (ref 3.5–11.3)

## 2023-01-28 PROCEDURE — 70450 CT HEAD/BRAIN W/O DYE: CPT

## 2023-01-28 PROCEDURE — 93010 ELECTROCARDIOGRAM REPORT: CPT | Performed by: INTERNAL MEDICINE

## 2023-01-28 PROCEDURE — 94761 N-INVAS EAR/PLS OXIMETRY MLT: CPT

## 2023-01-28 PROCEDURE — 6370000000 HC RX 637 (ALT 250 FOR IP)

## 2023-01-28 PROCEDURE — 87205 SMEAR GRAM STAIN: CPT

## 2023-01-28 PROCEDURE — 85025 COMPLETE CBC W/AUTO DIFF WBC: CPT

## 2023-01-28 PROCEDURE — 6360000002 HC RX W HCPCS

## 2023-01-28 PROCEDURE — 87070 CULTURE OTHR SPECIMN AEROBIC: CPT

## 2023-01-28 PROCEDURE — 6360000002 HC RX W HCPCS: Performed by: STUDENT IN AN ORGANIZED HEALTH CARE EDUCATION/TRAINING PROGRAM

## 2023-01-28 PROCEDURE — 2000000000 HC ICU R&B

## 2023-01-28 PROCEDURE — 85055 RETICULATED PLATELET ASSAY: CPT

## 2023-01-28 PROCEDURE — 6360000002 HC RX W HCPCS: Performed by: NURSE ANESTHETIST, CERTIFIED REGISTERED

## 2023-01-28 PROCEDURE — 36415 COLL VENOUS BLD VENIPUNCTURE: CPT

## 2023-01-28 PROCEDURE — 99221 1ST HOSP IP/OBS SF/LOW 40: CPT | Performed by: SURGERY

## 2023-01-28 PROCEDURE — 6370000000 HC RX 637 (ALT 250 FOR IP): Performed by: FAMILY MEDICINE

## 2023-01-28 PROCEDURE — 99223 1ST HOSP IP/OBS HIGH 75: CPT | Performed by: PSYCHIATRY & NEUROLOGY

## 2023-01-28 PROCEDURE — 2500000003 HC RX 250 WO HCPCS: Performed by: NURSE ANESTHETIST, CERTIFIED REGISTERED

## 2023-01-28 PROCEDURE — 2580000003 HC RX 258: Performed by: NURSE ANESTHETIST, CERTIFIED REGISTERED

## 2023-01-28 PROCEDURE — 2580000003 HC RX 258

## 2023-01-28 PROCEDURE — 74018 RADEX ABDOMEN 1 VIEW: CPT

## 2023-01-28 DEVICE — CODMAN® CERTAS® PLUS PROGRAMMABLE VALVE INLINE VALVE WITH ACCESSORIES AND UNITIZED BACTISEAL® CATHETER
Type: IMPLANTABLE DEVICE | Site: CRANIAL | Status: FUNCTIONAL
Brand: CODMAN CERTAS® PLUS

## 2023-01-28 RX ORDER — ACETAMINOPHEN 325 MG/1
650 TABLET ORAL EVERY 4 HOURS PRN
Status: DISCONTINUED | OUTPATIENT
Start: 2023-01-28 | End: 2023-01-29 | Stop reason: HOSPADM

## 2023-01-28 RX ORDER — OXYCODONE HYDROCHLORIDE AND ACETAMINOPHEN 5; 325 MG/1; MG/1
1 TABLET ORAL EVERY 4 HOURS PRN
Status: DISCONTINUED | OUTPATIENT
Start: 2023-01-28 | End: 2023-01-29 | Stop reason: HOSPADM

## 2023-01-28 RX ORDER — FENTANYL CITRATE 50 UG/ML
10 INJECTION, SOLUTION INTRAMUSCULAR; INTRAVENOUS EVERY 5 MIN PRN
Status: DISCONTINUED | OUTPATIENT
Start: 2023-01-28 | End: 2023-01-28

## 2023-01-28 RX ORDER — SODIUM CHLORIDE 9 MG/ML
INJECTION, SOLUTION INTRAVENOUS PRN
Status: DISCONTINUED | OUTPATIENT
Start: 2023-01-28 | End: 2023-01-28 | Stop reason: HOSPADM

## 2023-01-28 RX ORDER — SODIUM CHLORIDE 0.9 % (FLUSH) 0.9 %
5-40 SYRINGE (ML) INJECTION PRN
Status: DISCONTINUED | OUTPATIENT
Start: 2023-01-28 | End: 2023-01-28 | Stop reason: HOSPADM

## 2023-01-28 RX ORDER — OXYCODONE HYDROCHLORIDE 5 MG/1
5 TABLET ORAL EVERY 4 HOURS PRN
Status: DISCONTINUED | OUTPATIENT
Start: 2023-01-28 | End: 2023-01-28

## 2023-01-28 RX ORDER — MAGNESIUM HYDROXIDE 1200 MG/15ML
LIQUID ORAL CONTINUOUS PRN
Status: DISCONTINUED | OUTPATIENT
Start: 2023-01-27 | End: 2023-01-28 | Stop reason: HOSPADM

## 2023-01-28 RX ORDER — SODIUM CHLORIDE 0.9 % (FLUSH) 0.9 %
5-40 SYRINGE (ML) INJECTION PRN
Status: DISCONTINUED | OUTPATIENT
Start: 2023-01-28 | End: 2023-01-29 | Stop reason: HOSPADM

## 2023-01-28 RX ORDER — QUETIAPINE FUMARATE 25 MG/1
12.5 TABLET, FILM COATED ORAL NIGHTLY
Status: DISCONTINUED | OUTPATIENT
Start: 2023-01-28 | End: 2023-01-28

## 2023-01-28 RX ORDER — PHENOBARBITAL 32.4 MG/1
64.8 TABLET ORAL DAILY
Status: DISCONTINUED | OUTPATIENT
Start: 2023-01-28 | End: 2023-01-29 | Stop reason: HOSPADM

## 2023-01-28 RX ORDER — OXCARBAZEPINE 300 MG/1
300 TABLET, FILM COATED ORAL 2 TIMES DAILY
Status: DISCONTINUED | OUTPATIENT
Start: 2023-01-28 | End: 2023-01-29 | Stop reason: HOSPADM

## 2023-01-28 RX ORDER — SODIUM CHLORIDE 0.9 % (FLUSH) 0.9 %
5-40 SYRINGE (ML) INJECTION EVERY 12 HOURS SCHEDULED
Status: DISCONTINUED | OUTPATIENT
Start: 2023-01-28 | End: 2023-01-29 | Stop reason: HOSPADM

## 2023-01-28 RX ORDER — SODIUM CHLORIDE 9 MG/ML
INJECTION, SOLUTION INTRAVENOUS PRN
Status: DISCONTINUED | OUTPATIENT
Start: 2023-01-28 | End: 2023-01-29 | Stop reason: HOSPADM

## 2023-01-28 RX ORDER — SODIUM CHLORIDE 0.9 % (FLUSH) 0.9 %
5-40 SYRINGE (ML) INJECTION EVERY 12 HOURS SCHEDULED
Status: DISCONTINUED | OUTPATIENT
Start: 2023-01-28 | End: 2023-01-28 | Stop reason: HOSPADM

## 2023-01-28 RX ORDER — NEOSTIGMINE METHYLSULFATE 5 MG/5 ML
SYRINGE (ML) INTRAVENOUS PRN
Status: DISCONTINUED | OUTPATIENT
Start: 2023-01-28 | End: 2023-01-28 | Stop reason: SDUPTHER

## 2023-01-28 RX ORDER — ACETAMINOPHEN 650 MG/1
650 SUPPOSITORY RECTAL EVERY 4 HOURS PRN
Status: DISCONTINUED | OUTPATIENT
Start: 2023-01-28 | End: 2023-01-29 | Stop reason: HOSPADM

## 2023-01-28 RX ORDER — FENTANYL CITRATE 50 UG/ML
15 INJECTION, SOLUTION INTRAMUSCULAR; INTRAVENOUS EVERY 5 MIN PRN
Status: DISCONTINUED | OUTPATIENT
Start: 2023-01-28 | End: 2023-01-28

## 2023-01-28 RX ORDER — PHENOBARBITAL 32.4 MG/1
97.2 TABLET ORAL NIGHTLY
Status: DISCONTINUED | OUTPATIENT
Start: 2023-01-28 | End: 2023-01-29 | Stop reason: HOSPADM

## 2023-01-28 RX ORDER — MORPHINE SULFATE 2 MG/ML
0.5 INJECTION, SOLUTION INTRAMUSCULAR; INTRAVENOUS EVERY 5 MIN PRN
Status: DISCONTINUED | OUTPATIENT
Start: 2023-01-28 | End: 2023-01-28

## 2023-01-28 RX ORDER — GLYCOPYRROLATE 0.2 MG/ML
INJECTION INTRAMUSCULAR; INTRAVENOUS PRN
Status: DISCONTINUED | OUTPATIENT
Start: 2023-01-28 | End: 2023-01-28 | Stop reason: SDUPTHER

## 2023-01-28 RX ORDER — OXYCODONE HYDROCHLORIDE AND ACETAMINOPHEN 5; 325 MG/1; MG/1
2 TABLET ORAL EVERY 4 HOURS PRN
Status: DISCONTINUED | OUTPATIENT
Start: 2023-01-28 | End: 2023-01-29 | Stop reason: HOSPADM

## 2023-01-28 RX ORDER — FENTANYL CITRATE 50 UG/ML
25 INJECTION, SOLUTION INTRAMUSCULAR; INTRAVENOUS
Status: DISCONTINUED | OUTPATIENT
Start: 2023-01-28 | End: 2023-01-28

## 2023-01-28 RX ORDER — LIDOCAINE HYDROCHLORIDE AND EPINEPHRINE 10; 10 MG/ML; UG/ML
INJECTION, SOLUTION INFILTRATION; PERINEURAL PRN
Status: DISCONTINUED | OUTPATIENT
Start: 2023-01-27 | End: 2023-01-28 | Stop reason: HOSPADM

## 2023-01-28 RX ADMIN — OXYCODONE HYDROCHLORIDE AND ACETAMINOPHEN 1 TABLET: 5; 325 TABLET ORAL at 20:46

## 2023-01-28 RX ADMIN — FENTANYL CITRATE 50 MCG: 0.05 INJECTION, SOLUTION INTRAMUSCULAR; INTRAVENOUS at 01:40

## 2023-01-28 RX ADMIN — PHENOBARBITAL 97.2 MG: 32.4 TABLET ORAL at 21:23

## 2023-01-28 RX ADMIN — ONDANSETRON 4 MG: 2 INJECTION INTRAMUSCULAR; INTRAVENOUS at 03:30

## 2023-01-28 RX ADMIN — Medication 2000 MG: at 18:31

## 2023-01-28 RX ADMIN — Medication 2 MG: at 03:18

## 2023-01-28 RX ADMIN — CEFAZOLIN 2 G: 1 INJECTION, POWDER, FOR SOLUTION INTRAMUSCULAR; INTRAVENOUS at 03:19

## 2023-01-28 RX ADMIN — FENTANYL CITRATE 25 MCG: 50 INJECTION, SOLUTION INTRAMUSCULAR; INTRAVENOUS at 05:35

## 2023-01-28 RX ADMIN — Medication 100 MCG: at 02:10

## 2023-01-28 RX ADMIN — PHENOBARBITAL 64.8 MG: 32.4 TABLET ORAL at 11:17

## 2023-01-28 RX ADMIN — FENTANYL CITRATE 50 MCG: 0.05 INJECTION, SOLUTION INTRAMUSCULAR; INTRAVENOUS at 01:18

## 2023-01-28 RX ADMIN — FENTANYL CITRATE 15 MCG: 50 INJECTION INTRAMUSCULAR; INTRAVENOUS at 04:04

## 2023-01-28 RX ADMIN — FENTANYL CITRATE 15 MCG: 50 INJECTION INTRAMUSCULAR; INTRAVENOUS at 04:18

## 2023-01-28 RX ADMIN — FENTANYL CITRATE 25 MCG: 0.05 INJECTION, SOLUTION INTRAMUSCULAR; INTRAVENOUS at 03:47

## 2023-01-28 RX ADMIN — FENTANYL CITRATE 25 MCG: 50 INJECTION, SOLUTION INTRAMUSCULAR; INTRAVENOUS at 08:17

## 2023-01-28 RX ADMIN — Medication 100 MCG: at 02:37

## 2023-01-28 RX ADMIN — FENTANYL CITRATE 25 MCG: 0.05 INJECTION, SOLUTION INTRAMUSCULAR; INTRAVENOUS at 03:40

## 2023-01-28 RX ADMIN — SODIUM CHLORIDE, PRESERVATIVE FREE 10 ML: 5 INJECTION INTRAVENOUS at 21:00

## 2023-01-28 RX ADMIN — Medication 2000 MG: at 11:09

## 2023-01-28 RX ADMIN — Medication 100 MCG: at 01:08

## 2023-01-28 RX ADMIN — OXCARBAZEPINE 300 MG: 300 TABLET, FILM COATED ORAL at 11:09

## 2023-01-28 RX ADMIN — OXCARBAZEPINE 300 MG: 300 TABLET, FILM COATED ORAL at 20:46

## 2023-01-28 RX ADMIN — SODIUM CHLORIDE, POTASSIUM CHLORIDE, SODIUM LACTATE AND CALCIUM CHLORIDE: 600; 310; 30; 20 INJECTION, SOLUTION INTRAVENOUS at 01:50

## 2023-01-28 RX ADMIN — ACETAMINOPHEN 650 MG: 325 TABLET ORAL at 14:52

## 2023-01-28 RX ADMIN — GLYCOPYRROLATE 0.2 MG: 0.2 INJECTION INTRAMUSCULAR; INTRAVENOUS at 03:18

## 2023-01-28 RX ADMIN — Medication 50 MCG: at 00:36

## 2023-01-28 ASSESSMENT — PAIN SCALES - GENERAL: PAINLEVEL_OUTOF10: 10

## 2023-01-28 ASSESSMENT — PAIN DESCRIPTION - LOCATION: LOCATION: ABDOMEN

## 2023-01-28 NOTE — CONSULTS
Department of Neurosurgery                                                             Consult Note      Reason for Consult:  hydrocephalus,  shunt malfunction  Requesting Physician:  Dr. Reanna Handy  Neurosurgeon:   [] Dr. Cricket Hernández  [] Dr. Izaguirre Forward  [x] Dr. Saira El     HPI    History Obtained From: EMR. Unable to elicit hx from patient due to pt unresponsive/nonverbal during encounter. No family at bedside. The patient is a 28 y.o. female history of infantile cerebral palsy, seizure disorder (on phenobarbital 64.8 mg qd/97.2 mg nightly and aptiom 600 mg qd), lithotripsy with stent removal in 2015, ? Past ICH, and brain surgeries with shunt placements x2 with who presents on 1/27/2023 as a direct admit from [facility] ED for management of hydrocephalus in setting of  shunt malfunction. History/etiology of  shunt unknown. Pt reportedly has wheelchair and is verbal/interactive at baseline. Father is main caregiver. She usually converses and has coffee with father but had depressed consciousness after defecation syncope so family took her to the ED. CTH at outlying facility showed malfunction with mild hydrocephalus shunt appearing to be disconnected in the right occipital scalp/upper neck and dilation of third lateral ventricles. This prompted transfer patient to [facility] for emergent neurosurgical evaluation. On initial encounter with NSG team, patient found unresponsive but withdrawing to pain with intact pupillary reflexes, and maintaining airway.        PAST MEDICAL HISTORY :       Past Medical History:        Diagnosis Date    Brain bleed ([address]     past history - at age15    Chronic bilateral low back pain without sciatica 12/8/2016    History of closed head injury     dad also states there was \"blood on her brain\" with this & occassional bleeds into shunt    Infantile cerebral palsy ([address]     Kidney stone     Seizures ([address] Past Surgical History:        Procedure Laterality Date    BRAIN SURGERY      shunts x 2    EYE SURGERY      x2    LITHOTRIPSY Right 08/18/2015    With Stent Removal    VENTRICULOPERITONEAL SHUNT Right 01/28/2023    Revision       Social History:   Social History     Socioeconomic History    Marital status: Single     Spouse name: Not on file    Number of children: Not on file    Years of education: Not on file    Highest education level: Not on file   Occupational History    Not on file   Tobacco Use    Smoking status: Never    Smokeless tobacco: Never   Vaping Use    Vaping Use: Never used   Substance and Sexual Activity    Alcohol use: No    Drug use: Never    Sexual activity: Not on file   Other Topics Concern    Not on file   Social History Narrative    Not on file     Social Determinants of Health     Financial Resource Strain: Not on file   Food Insecurity: Not on file   Transportation Needs: Not on file   Physical Activity: Not on file   Stress: Not on file   Social Connections: Not on file   Intimate Partner Violence: Not on file   Housing Stability: Not on file       Family History:   No family history on file. Allergies:  Patient has no known allergies. Home Medications:  Prior to Admission medications    Medication Sig Start Date End Date Taking? Authorizing Provider   polyethylene glycol (GAVILAX) 17 GM/SCOOP powder mix 17 grams with liquid and drink BY MOUTH daily 11/15/21   Lennie Blue MD   Diapers & Supplies MISC (Adult medium)  Use daily as needed.  11/9/20   Lennie Blue MD   APTIOM 600 MG TABS tablet Take 600 mg by mouth daily 10/24/20   Historical Provider, MD   [address] 1 each by Does not apply route daily as needed (as needed) Adult LG 10/29/20   Lennie Blue MD   QUEtiapine (SEROQUEL) 25 MG tablet take 1/2 tablet by mouth at bedtime 2/20/18   Historical Provider, MD   PERIOGARD 0.12 % solution Rinse with 1/2 ounce TWICE DAILY 8/16/16   Historical Provider, MD   [facility]. Devices ([address]) [address] Gait  with wheels, square with seat sling seat and braces 6/17/16   Martha Hurst DO   naproxen sodium (ANAPROX) 550 MG tablet  6/1/16   Historical Provider, MD   Ankle Foot Arthosis MISC by Does not apply route.  Bilateral AFO brace 2/4/15   Jennifer Kingsley MD   phenobarbital (LUMINAL) 32.4 MG tablet Take 2 tabs every morning, and 3 tabs at bedtime     Historical Provider, MD       Current Medications:   Current Facility-Administered Medications: sodium chloride flush 0.9 % injection 5-40 mL, 5-40 mL, IntraVENous, 2 times per day  sodium chloride flush 0.9 % injection 5-40 mL, 5-40 mL, IntraVENous, PRN  0.9 % sodium chloride infusion, , IntraVENous, PRN  ceFAZolin (ANCEF) 2000 mg in sterile water 20 mL IV syringe, 2,000 mg, IntraVENous, Q8H  fentaNYL (SUBLIMAZE) injection 25 mcg, 25 mcg, IntraVENous, Q2H PRN  acetaminophen (TYLENOL) tablet 650 mg, 650 mg, Oral, Q4H PRN **OR** acetaminophen (TYLENOL) suppository 650 mg, 650 mg, Rectal, I1J PRN  eslicarbazepine (APTIOM) tablet 600 mg, 600 mg, Oral, Daily  PHENobarbital (LUMINAL) tablet 64.8 mg, 64.8 mg, Oral, Daily  PHENobarbital (LUMINAL) tablet 97.2 mg, 97.2 mg, Oral, Nightly  QUEtiapine (SEROQUEL) tablet 12.5 mg, 12.5 mg, Oral, Nightly  sodium chloride flush 0.9 % injection 5-40 mL, 5-40 mL, IntraVENous, 2 times per day  sodium chloride flush 0.9 % injection 5-40 mL, 5-40 mL, IntraVENous, PRN  0.9 % sodium chloride infusion, , IntraVENous, PRN  LORazepam (ATIVAN) injection 1 mg, 1 mg, IntraVENous, Q5 Min PRN  ondansetron (ZOFRAN-ODT) disintegrating tablet 4 mg, 4 mg, Oral, Q8H PRN **OR** ondansetron (ZOFRAN) injection 4 mg, 4 mg, IntraVENous, Q6H PRN  polyethylene glycol (GLYCOLAX) packet 17 g, 17 g, Oral, Daily PRN  0.9 % sodium chloride infusion, , IntraVENous, Continuous    REVIEW OF SYSTEMS:       Review of Systems   Unable to perform ROS: Patient unresponsive     PHYSICAL EXAM:       /74 Pulse 86   Temp 97.7 °F (36.5 °C)   Resp 12   Wt 61 lb 1.1 oz (27.7 kg)   SpO2 99%   BMI 12.76 kg/m²     PHYSICAL EXAM:  CONSTITUTIONAL:  Thin, unresponsive   HEAD:  atraumatic    EYES:  PERRLA   LUNGS:  Normal respiratory effort, saturating in high 90s on room air   CARDIOVASCULAR:  Regular rate and rhythm   EXTREMITIES Bilateral extremity contractures (likely chronic)   NEUROLOGIC:  Mental Status:  comatose     EYE OPENING     Spontaneous - 4 []       To voice - 3 []       To pain - 2 []       None - 1 [x]    VERBAL RESPONSE     Appropriate, oriented - 5 []       Dazed or confused - 4 []       Syllables, expletives - 3 []       Grunts - 2 []       None - 1 [x]    MOTOR RESPONSE     Spontaneous, command - 6 []       Localizes pain - 5 []       Withdraws pain - 4 [x]       Abnormal flexion - 3 []       Abnormal extension - 2 []       None - 1 []             Total GCS: 6                Cranial Nerves:    III: Pupils:  equal, round, reactive to light  VII: Facial strength: No obvious facial asymmetry     Motor Exam:    Tone: Decreased muscle mass/tone     Withdraws to pain with all 4 limbs     Sensory:    Touch:     Withdraws to pain with limbs  No grimacing to pain     Gait:  not tested   SKIN No obvious ecchymosis, rashes, or lesions        LABS AND IMAGING:     CBC with Differential:    Lab Results   Component Value Date/Time    WBC 15.0 01/28/2023 06:19 AM    RBC 4.02 01/28/2023 06:19 AM    RBC 4.59 01/20/2012 02:28 PM    HGB 12.5 01/28/2023 06:19 AM    HCT 38.6 01/28/2023 06:19 AM    PLT See Reflexed IPF Result 01/28/2023 06:19 AM     01/20/2012 02:28 PM    MCV 96.0 01/28/2023 06:19 AM    MCH 31.1 01/28/2023 06:19 AM    MCHC 32.4 01/28/2023 06:19 AM    RDW 14.9 01/28/2023 06:19 AM    LYMPHOPCT 9 01/28/2023 06:19 AM    MONOPCT 7 01/28/2023 06:19 AM    BASOPCT 1 01/28/2023 06:19 AM    MONOSABS 0.99 01/28/2023 06:19 AM    LYMPHSABS 1.28 01/28/2023 06:19 AM    EOSABS 0.13 01/28/2023 06:19 AM    BASOSABS 0.13 01/28/2023 06:19 AM    DIFFTYPE YES 01/26/2023 11:15 PM     BMP:    Lab Results   Component Value Date/Time     01/27/2023 11:29 AM    K 3.7 01/27/2023 11:29 AM     01/27/2023 11:29 AM    CO2 22 01/27/2023 11:29 AM    BUN 4 01/27/2023 11:29 AM    LABALBU 4.1 01/27/2023 11:29 AM    LABALBU 4.7 01/20/2012 02:28 PM    CREATININE <0.40 01/27/2023 11:29 AM    CALCIUM 9.0 01/27/2023 11:29 AM    GFRAA >60 12/10/2021 11:01 AM    LABGLOM Can not be calculated 01/27/2023 11:29 AM    GLUCOSE 119 01/27/2023 11:29 AM    GLUCOSE 77 01/20/2012 02:28 PM       Radiology Review:      CT HEAD WO CONTRAST     Result Date: 1/27/2023  Shunt malfunction with marked hydrocephalus. Fourth ventricle normal size. Third and lateral ventricles are dilated. The shunt appears to be disconnected in the right occipital scalp/upper neck. The findings were discussed with Dr. Ning Diaz at 1300 hours. XR CHEST PORTABLE     Result Date: 1/26/2023  Nonacute portable chest.       ASSESSMENT AND PLAN:       Patient Active Problem List   Diagnosis    Infantile cerebral palsy (HCC)    Congenital quadriplegia    Seizure ([address]    Pyelonephritis due to Escherichia coli    Ureteral stone with hydronephrosis    Renal lithiasis    Chronic bilateral low back pain without sciatica    Hydrocephalus (HCC)         A/P:  This is a 28 y.o. female with with history of cerebral palsy, seizures,  shunt who [facility], rapidly progressing worsening and mentation to unresponsive state over the course of day, was found with CT head showing disconnected  shunt and hydrocephalus subsequently transferred to [facility] for neurosurgical evaluation and neuro critical care.     Impression is unresponsive state elicited by acute encephalopathy 2/2  shunt malfunction with associated hydrocephalus       - Repeat CT head now with stealth protocol & Xray shunt series  - Labs from [facility] hospital reviewed, will order additional labs: PTT, PTT, fibrinogen, type and screen  - Mannitol 25 g IV & IVF now  - Will need emergent  shunt revision, prepare OR once stat imaging & additional labs reviewed  - HOB: 30 degrees  - NPO   - Will need another repeat CTH post op, as well as Ancef &   - Neuro checks per protocol  - Hold all antiplatelets and anticoagulants  - We recommend SBP < 140   - Determine the lower limit of SBP clinically based on mentation    Patient care discussed with attending, Dr Rod Hunter,    Patient to be reevaluated by attending,     Additional recommendations may follow. Please contact neurosurgery with any changes in patients neurologic status. Thank you for your consult. Mika Kim DO     Neurology PGY-2  1/28/2023  7:58 AM    This is a delayed entry note and reflect's the patient's condition and management plan at time of service.

## 2023-01-28 NOTE — BRIEF OP NOTE
Brief Postoperative Note      Patient: Rex Chavez  YOB: 1990  MRN: [id number]    Date of Procedure: 1/27/2023    Pre-Op Diagnosis: distal shunt malfunction, severe hydrocephalus     Post-Op Diagnosis: Same       Procedure(s):  VENTRICULAR PERITONEAL SHUNT REVISION, REMOVAL OF MALFUNTION VENTRICULAR PERITONEAL SHUNT    Surgeon(s):  Erik oPp DO    Assistant:  Miroslava Ring MD  Physician Assistant: Emily Cohen PA-C    Anesthesia: General    Estimated Blood Loss (mL): Minimal    Complications: None    Specimens:   ID Type Source Tests Collected by Time Destination   2 : CSF FLUID CSF CSF Shunt GLUCOSE, CSF, PROTEIN, CSF, CELL COUNT WITH DIFFERENTIAL, CSF, CULTURE, CSF, CULTURE, BODY FLUID Erik Pop DO 1/28/2023 0220        Implants:  Implant Name Type Inv. Item Serial No.  Lot No. LRB No. Used Action   VALVE VENT DRNGE L120CM CEREB LN PROGRAMMABLE UNITZ - [id number]  VALVE VENT DRNGE L120CM CEREB LN PROGRAMMABLE UNITZ  [employer] [id number] Right 1 Implanted   SCREW BNE L4MM DIA1.5MM CRSS PIN SELF DRL - [id number]  SCREW BNE L4MM DIA1.5MM CRSS PIN SELF DRL  [employer]ANI[employer]-  Right 2 Implanted         Drains:   Urinary Catheter 01/27/23 (Active)   Catheter Indications Perioperative use for selected surgical procedures 01/27/23 2200   Site Assessment Swelling 01/27/23 2200   Urine Color Yellow 01/27/23 2200   Urine Appearance Clear 01/27/23 2200   Urine Odor Malodorous 01/27/23 2200   Collection Container Standard 01/27/23 2200   Securement Method Tape 01/27/23 2200   Catheter Best Practices  Drainage tube clipped to bed;Catheter secured to thigh; Bag below bladder; Tamper seal intact 01/27/23 2200   Status Draining 01/27/23 2200   Output (mL) 55 mL 01/27/23 2200       Findings: significant fibrosis along previous shunt tract.      Electronically signed by Erik Pop DO on 1/28/2023 at 3:31 AM

## 2023-01-28 NOTE — H&P
Neuro ICU History & Physical    Patient Name: Rex Chavez  Patient : 1990  Room/Bed: 0126/0126-01  Code Status: FULL  Allergies: No Known Allergies    CHIEF COMPLAINT     AMS    HPI    History Obtained From: EMR. Unable to elicit hx from patient due to pt unresponsive/nonverbal during encounter. No family at bedside. The patient is a 28 y.o. female history of infantile cerebral palsy, seizure disorder (on phenobarbital 64.8 mg qd/97.2 mg nightly and aptiom 600 mg qd), lithotripsy with stent removal in , ? Past ICH, and brain surgeries with shunt placements x2 with who presents on 2023 as a direct admit from [facility] ED for management of hydrocephalus in setting of  shunt malfunction. History/etiology of  shunt unknown. Pt reportedly has wheelchair and is verbal/interactive at baseline. Father is main caregiver. She usually converses and has coffee with father but had depressed consciousness after defecation syncope so family took her to the ED. CTH at outlying facility showed malfunction with mild hydrocephalus shunt appearing to be disconnected in the right occipital scalp/upper neck and dilation of third lateral ventricles. This prompted transfer patient to [facility] for emergent neurosurgical evaluation. On initial encounter with [facility] team, patient found unresponsive but withdrawing to pain with intact pupillary reflexes, and maintaining airway.        Admitted to ICU From: [facility]  Reason for ICU Admission: Unresponsive state,  shunt malfunction with associated hydrocephalus       PATIENT HISTORY   Past Medical History:        Diagnosis Date    Brain bleed ([address]     past history - at age15    Chronic bilateral low back pain without sciatica 2016    History of closed head injury     dad also states there was \"blood on her brain\" with this & occassional bleeds into shunt    Infantile cerebral palsy ([address]     Kidney stone     Seizures [facility])        Past Surgical History:        Procedure Laterality Date    BRAIN SURGERY      shunts x 2    EYE SURGERY      x2    LITHOTRIPSY Right 08/18/2015    With Stent Removal    VENTRICULOPERITONEAL SHUNT Right 01/28/2023    Revision       Social History:   Social History     Socioeconomic History    Marital status: Single     Spouse name: Not on file    Number of children: Not on file    Years of education: Not on file    Highest education level: Not on file   Occupational History    Not on file   Tobacco Use    Smoking status: Never    Smokeless tobacco: Never   Vaping Use    Vaping Use: Never used   Substance and Sexual Activity    Alcohol use: No    Drug use: Never    Sexual activity: Not on file   Other Topics Concern    Not on file   Social History Narrative    Not on file     Social Determinants of Health     Financial Resource Strain: Not on file   Food Insecurity: Not on file   Transportation Needs: Not on file   Physical Activity: Not on file   Stress: Not on file   Social Connections: Not on file   Intimate Partner Violence: Not on file   Housing Stability: Not on file       Family History:   No family history on file. Allergies:    Patient has no known allergies. Medications Prior to Admission:    Medications Prior to Admission: polyethylene glycol (GAVILAX) 17 GM/SCOOP powder, mix 17 grams with liquid and drink BY MOUTH daily  Diapers & Supplies MISC, (Adult medium)  Use daily as needed. APTIOM 600 MG TABS tablet, Take 600 mg by mouth daily  Diapers & Supplies MISC, 1 each by Does not apply route daily as needed (as needed) Adult LG  QUEtiapine (SEROQUEL) 25 MG tablet, take 1/2 tablet by mouth at bedtime  PERIOGARD 0.12 % solution, Rinse with 1/2 ounce TWICE DAILY  Misc. Devices ([address]) MISC, Gait  with wheels, square with seat sling seat and braces  naproxen sodium (ANAPROX) 550 MG tablet,   Ankle Foot Arthosis MISC, by Does not apply route.  Bilateral AFO brace  phenobarbital (LUMINAL) 32.4 MG tablet, Take 2 tabs every morning, and 3 tabs at bedtime     Current Medications:  Current Facility-Administered Medications: sodium chloride flush 0.9 % injection 5-40 mL, 5-40 mL, IntraVENous, 2 times per day  sodium chloride flush 0.9 % injection 5-40 mL, 5-40 mL, IntraVENous, PRN  0.9 % sodium chloride infusion, , IntraVENous, PRN  ceFAZolin (ANCEF) 2000 mg in sterile water 20 mL IV syringe, 2,000 mg, IntraVENous, Q8H  fentaNYL (SUBLIMAZE) injection 25 mcg, 25 mcg, IntraVENous, Q2H PRN  acetaminophen (TYLENOL) tablet 650 mg, 650 mg, Oral, Q4H PRN **OR** acetaminophen (TYLENOL) suppository 650 mg, 650 mg, Rectal, Q4H PRN  sodium chloride flush 0.9 % injection 5-40 mL, 5-40 mL, IntraVENous, 2 times per day  sodium chloride flush 0.9 % injection 5-40 mL, 5-40 mL, IntraVENous, PRN  0.9 % sodium chloride infusion, , IntraVENous, PRN  LORazepam (ATIVAN) injection 1 mg, 1 mg, IntraVENous, Q5 Min PRN  ondansetron (ZOFRAN-ODT) disintegrating tablet 4 mg, 4 mg, Oral, Q8H PRN **OR** ondansetron (ZOFRAN) injection 4 mg, 4 mg, IntraVENous, Q6H PRN  polyethylene glycol (GLYCOLAX) packet 17 g, 17 g, Oral, Daily PRN  0.9 % sodium chloride infusion, , IntraVENous, Continuous    REVIEW OF SYSTEMS     Review of Systems   Unable to perform ROS: Patient unresponsive     PHYSICAL EXAM:     /74   Pulse 86   Temp 97.7 °F (36.5 °C)   Resp 12   Wt 61 lb 1.1 oz (27.7 kg)   SpO2 99%   BMI 12.76 kg/m²     PHYSICAL EXAM:  CONSTITUTIONAL:  Thin, unresponsive   HEAD:  atraumatic    EYES:  PERRLA   LUNGS:  Normal respiratory effort, saturating in high 90s on room air   CARDIOVASCULAR:  Regular rate and rhythm   EXTREMITIES Bilateral extremity contractures (likely chronic)   NEUROLOGIC:  Mental Status:  comatose    EYE OPENING     Spontaneous - 4 []       To voice - 3 []       To pain - 2 []       None - 1 [x]    VERBAL RESPONSE     Appropriate, oriented - 5 []       Dazed or confused - 4 []       Syllables, expletives - 3 []       Grunts - 2 []       None - 1 [x]    MOTOR RESPONSE     Spontaneous, command - 6 []       Localizes pain - 5 []       Withdraws pain - 4 [x]       Abnormal flexion - 3 []       Abnormal extension - 2 []       None - 1 []            Total GCS: 6               Cranial Nerves:    III: Pupils:  equal, round, reactive to light  VII: Facial strength: No obvious facial asymmetry    Motor Exam:    Tone: Decreased muscle mass/tone    Withdraws to pain with all 4 limbs    Sensory:    Touch:     Withdraws to pain with limbs  No grimacing to pain    Gait:  not tested   SKIN No obvious ecchymosis, rashes, or lesions      LABS AND IMAGING:     RECENT LABS:  CBC with Differential:    Lab Results   Component Value Date/Time    WBC 15.0 01/28/2023 06:19 AM    RBC 4.02 01/28/2023 06:19 AM    RBC 4.59 01/20/2012 02:28 PM    HGB 12.5 01/28/2023 06:19 AM    HCT 38.6 01/28/2023 06:19 AM    PLT See Reflexed IPF Result 01/28/2023 06:19 AM     01/20/2012 02:28 PM    MCV 96.0 01/28/2023 06:19 AM    MCH 31.1 01/28/2023 06:19 AM    MCHC 32.4 01/28/2023 06:19 AM    RDW 14.9 01/28/2023 06:19 AM    LYMPHOPCT 9 01/28/2023 06:19 AM    MONOPCT 7 01/28/2023 06:19 AM    BASOPCT 1 01/28/2023 06:19 AM    MONOSABS 0.99 01/28/2023 06:19 AM    LYMPHSABS 1.28 01/28/2023 06:19 AM    EOSABS 0.13 01/28/2023 06:19 AM    BASOSABS 0.13 01/28/2023 06:19 AM    DIFFTYPE YES 01/26/2023 11:15 PM     BMP:    Lab Results   Component Value Date/Time     01/27/2023 11:29 AM    K 3.7 01/27/2023 11:29 AM     01/27/2023 11:29 AM    CO2 22 01/27/2023 11:29 AM    BUN 4 01/27/2023 11:29 AM    LABALBU 4.1 01/27/2023 11:29 AM    LABALBU 4.7 01/20/2012 02:28 PM    CREATININE <0.40 01/27/2023 11:29 AM    CALCIUM 9.0 01/27/2023 11:29 AM    GFRAA >60 12/10/2021 11:01 AM    LABGLOM Can not be calculated 01/27/2023 11:29 AM    GLUCOSE 119 01/27/2023 11:29 AM    GLUCOSE 77 01/20/2012 02:28 PM       RADIOLOGY:     CT HEAD WO CONTRAST    Result Date: 1/27/2023  Shunt malfunction with marked hydrocephalus. Fourth ventricle normal size. Third and lateral ventricles are dilated. The shunt appears to be disconnected in the right occipital scalp/upper neck. The findings were discussed with Dr. Emeka Meehan at 1300 hours. XR CHEST PORTABLE    Result Date: 1/26/2023  Nonacute portable chest.       ASSESSMENT AND PLAN:         ASSESSMENT:     This is a 28 y.o. female with history of cerebral palsy, seizures,  shunt who initially presented with depressed consciousness at outlying facility, rapidly progressing worsening and mentation to unresponsive state over the course of day, was found with CT head showing disconnected  shunt and hydrocephalus subsequently transferred to [place] for neurosurgical evaluation and neuro critical care. Patient care will be discussed with attending, will reevaluate patient along with attending.      PLAN/MEDICAL DECISION MAKING:      NEUROLOGIC: Unresponsive state likely 2/2  shunt malfunction with associated hydrocephalus,  Seizure disorder  - Repeat CT head without contrast with stealth protocol stat  - X-ray shunt series  - Mannitol 25 g IV push  - Neuro checks per protocol   - Neurosurgery consult  - Continue home doses of phenobarbital and Aptiom  - seizure precautions and ativan as needed for seizures    CARDIOVASCULAR:  - Goal SBP < 140  - Continue telemetry    PULMONARY:  - saturating well on room air  - continuous SPO2 monitoring    RENAL/FLUID/ELECTROLYTE:  - BUN 4/ Creatinine <0.4  - IVF: NS at 68 cc/hr (weight based; initial ordered dose adjusted after weight updated)  - Replace electrolytes PRN  - Daily BMP    GI/NUTRITION:  NUTRITION:  - NPO    ID:  - Afebrile  - WBC 8.5  - Continue to monitor for fevers  - Daily CBC    HEME:   - H&H 13.6 and 44.8  - Platelets 243  - Daily CBC    ENDOCRINE:  - Continue to monitor blood glucose, goal <180    OTHER:  - PT/OT/ST     PROPHYLAXIS:  Stress ulcer: none    DVT PROPHYLAXIS:  - SCD's  - No chemical DVT ppx due to likely need for emergent surgery    DISPOSITION: Admit to [facility],   Neuro Critical Care Service   Neurology PGY2  1/28/2023     7:21 AM      This is a delayed entry note and reflect's the patient's condition and management plan at time of service.

## 2023-01-28 NOTE — ANESTHESIA PRE PROCEDURE
Department of Anesthesiology  Preprocedure Note       Name:  Leti Cantrell   Age:  28 y.o.  :  1990                                          MRN:  [id number]         Date:  2023      Surgeon: Lord Jenkins):  Torrey Davenport DO    Procedure: Procedure(s):  VENTRICULAR PERITONEAL SHUNT REVISION    Medications prior to admission:   Prior to Admission medications    Medication Sig Start Date End Date Taking? Authorizing Provider   polyethylene glycol (GAVILAX) 17 GM/SCOOP powder mix 17 grams with liquid and drink BY MOUTH daily 11/15/21   Rachell Hernandes MD   Diapers & Supplies MISC (Adult medium)  Use daily as needed. 20   Rachell Hernandes MD   APTIOM 600 MG TABS tablet Take 600 mg by mouth daily 10/24/20   Historical Provider, MD   [address] 1 each by Does not apply route daily as needed (as needed) Adult LG 10/29/20   Rachell Hernandes MD   QUEtiapine (SEROQUEL) 25 MG tablet take 1/2 tablet by mouth at bedtime 18   Historical Provider, MD   PERIOGARD 0.12 % solution Rinse with 1/2 ounce TWICE DAILY 16   Historical Provider, MD   [facility] [place] ([address]) [address] [facility] Gait  with wheels, square with seat sling seat and braces 16   Shannon Sneed, DO   naproxen sodium (ANAPROX) 550 MG tablet  16   Historical Provider, MD   Ankle Foot Arthosis MISC by Does not apply route.  Bilateral AFO brace 2/4/15   Rachell Hernandes MD   phenobarbital (LUMINAL) 32.4 MG tablet Take 2 tabs every morning, and 3 tabs at bedtime     Historical Provider, MD       Current medications:    Current Facility-Administered Medications   Medication Dose Route Frequency Provider Last Rate Last Admin    sodium chloride flush 0.9 % injection 5-40 mL  5-40 mL IntraVENous 2 times per day Dane Fudge, DO        sodium chloride flush 0.9 % injection 5-40 mL  5-40 mL IntraVENous PRN Dane Fudge, DO        0.9 % sodium chloride infusion   IntraVENous PRN Dane Fudge, DO        LORazepam (ATIVAN) injection 1 mg  1 mg IntraVENous Q5 Min PRN Gwen Austin,         ondansetron (ZOFRAN-ODT) disintegrating tablet 4 mg  4 mg Oral Q8H PRN Cleta Aliment, DO        Or    ondansetron (ZOFRAN) injection 4 mg  4 mg IntraVENous Q6H PRN Cleta Aliment, DO        polyethylene glycol (GLYCOLAX) packet 17 g  17 g Oral Daily PRN Cleta Aliment, DO        acetaminophen (TYLENOL) tablet 650 mg  650 mg Oral Q6H PRN Cleta Aliment, DO        Or    acetaminophen (TYLENOL) suppository 650 mg  650 mg Rectal Q6H PRN Cleta Aliment, DO        0.9 % sodium chloride infusion   IntraVENous Continuous Cleta Aliment, DO 68 mL/hr at 01/27/23 2153 New Bag at 01/27/23 2153     Facility-Administered Medications Ordered in Other Encounters   Medication Dose Route Frequency Provider Last Rate Last Admin    ondansetron [facility]) injection   IntraVENous PRN DIANA Valentin - CRNA   2 mg at 01/27/23 2222       Allergies:  No Known Allergies    Problem List:    Patient Active Problem List   Diagnosis Code    Infantile cerebral palsy ([address] G80.9    Congenital quadriplegia G80.8    Seizure ([address] R56.9    Pyelonephritis due to Escherichia coli N12, B96.20    Ureteral stone with hydronephrosis N13.2    Renal lithiasis N20.0    Chronic bilateral low back pain without sciatica M54.50, G89.29    Hydrocephalus (HCC) G91.9       Past Medical History:        Diagnosis Date    Brain bleed ([address]     past history - at age13    Chronic bilateral low back pain without sciatica 12/8/2016    History of closed head injury     dad also states there was \"blood on her brain\" with this & occassional bleeds into shunt    Infantile cerebral palsy ([address]     Kidney stone     Seizures ([address]        Past Surgical History:        Procedure Laterality Date    BRAIN SURGERY      shunts x 2    EYE SURGERY      x2    LITHOTRIPSY Right 08/18/2015    With Stent Removal       Social History:    Social History     Tobacco Use    Smoking status: Never    Smokeless tobacco: Never   Substance Use Topics    Alcohol use: No                                Counseling given: Not Answered      Vital Signs (Current):   Vitals:    01/27/23 2000 01/27/23 2100 01/27/23 2130 01/27/23 2200   BP: 113/73   106/75   Pulse: 72 77  56   Resp: 18 19  18   Temp: 99.4 °F (37.4 °C)   99.6 °F (37.6 °C)   SpO2: 98% 98%  97%   Weight:   61 lb 1.1 oz (27.7 kg)                                               BP Readings from Last 3 Encounters:   01/27/23 106/75   01/27/23 101/65   01/27/23 (!) 97/59       NPO Status:                                                                                 BMI:   Wt Readings from Last 3 Encounters:   01/27/23 61 lb 1.1 oz (27.7 kg)   01/27/23 61 lb (27.7 kg)   01/26/23 61 lb 6.4 oz (27.9 kg)     Body mass index is 12.76 kg/m².     CBC:   Lab Results   Component Value Date/Time    WBC 8.5 01/27/2023 11:29 AM    RBC 4.81 01/27/2023 11:29 AM    RBC 4.59 01/20/2012 02:28 PM    HGB 14.6 01/27/2023 11:29 AM    HCT 44.8 01/27/2023 11:29 AM    MCV 93.1 01/27/2023 11:29 AM    RDW 15.0 01/27/2023 11:29 AM     01/27/2023 11:29 AM     01/20/2012 02:28 PM       CMP:   Lab Results   Component Value Date/Time     01/27/2023 11:29 AM    K 3.7 01/27/2023 11:29 AM     01/27/2023 11:29 AM    CO2 22 01/27/2023 11:29 AM    BUN 4 01/27/2023 11:29 AM    CREATININE <0.40 01/27/2023 11:29 AM    GFRAA >60 12/10/2021 11:01 AM    LABGLOM Can not be calculated 01/27/2023 11:29 AM    GLUCOSE 119 01/27/2023 11:29 AM    GLUCOSE 77 01/20/2012 02:28 PM    PROT 7.0 01/27/2023 11:29 AM    CALCIUM 9.0 01/27/2023 11:29 AM    BILITOT 0.2 01/27/2023 11:29 AM    ALKPHOS 110 01/27/2023 11:29 AM    AST 15 01/27/2023 11:29 AM    ALT 13 01/27/2023 11:29 AM       POC Tests:   Recent Labs     01/27/23 2112   POCGLU 95       Coags:   Lab Results   Component Value Date/Time    PROTIME 12.4 01/27/2023 09:11 PM    INR 1.2 01/27/2023 09:11 PM APTT 26.9 01/27/2023 09:11 PM       HCG (If Applicable): No results found for: PREGTESTUR, PREGSERUM, HCG, HCGQUANT     ABGs: No results found for: PHART, PO2ART, JSJ4EMV, KTK7LVP, BEART, O8CFJPHE     Type & Screen (If Applicable):  No results found for: LABABO, LABRH    Drug/Infectious Status (If Applicable):  No results found for: HIV, HEPCAB    COVID-19 Screening (If Applicable): No results found for: COVID19        Anesthesia Evaluation  Patient summary reviewed no history of anesthetic complications:   Airway: Mallampati: Unable to assess / NA          Dental:    (+) poor dentition      Pulmonary:Negative Pulmonary ROS and normal exam                               Cardiovascular:Negative CV ROS                      Neuro/Psych:                ROS comment: Cerebral palsy, quadriplegia. Presented with altered mental status, malfunctioning  shunt. GI/Hepatic/Renal: Neg GI/Hepatic/Renal ROS            Endo/Other: Negative Endo/Other ROS                    Abdominal:             Vascular: negative vascular ROS. Other Findings:           Anesthesia Plan      general     ASA 3 - emergent       Induction: intravenous. MIPS: Postoperative opioids intended, Prophylactic antiemetics administered and Postoperative trial extubation. Anesthetic plan and risks discussed with father. Use of blood products discussed with father whom consented to blood products. Plan discussed with CRNA.                     Giovanny Herrera MD   1/27/2023

## 2023-01-28 NOTE — OP NOTE
Operative Note      Patient: Heather Velazco  YOB: 1990  MRN: [id number]    Date of Procedure: 1/27/2023    Pre-Op Diagnosis: hydrocephalus, shunt malfunction     Post-Op Diagnosis:  -       Procedure(s):  VENTRICULAR PERITONEAL SHUNT REVISION, REMOVAL OF [address] VENTRICULAR PERITONEAL SHUNT    Surgeon(s):  DO Flor Okeefe MD    Assistant:   Physician Assistant: Darryn Thomas PA-C    Anesthesia: General    Estimated Blood Loss (mL): less than 50     Complications: None    Specimens:   ID Type Source Tests Collected by Time Destination   2 : CSF FLUID CSF CSF Shunt GLUCOSE, CSF, PROTEIN, CSF, CELL COUNT WITH DIFFERENTIAL, CSF, CULTURE, CSF, CULTURE, BODY FLUID Jessica Alexis DO 1/28/2023 0220        Implants:  Implant Name Type Inv. Item Serial No.  Lot No. LRB No. Used Action   VALVE VENT DRNGE L120CM CEREB LN PROGRAMMABLE UNITZ - [id number]  VALVE VENT DRNGE L120CM CEREB LN PROGRAMMABLE UNITZ  [employer] [id number] Right 1 Implanted   SCREW BNE L4MM DIA1.5MM CRSS PIN SELF DRL - [id number]  SCREW BNE L4MM DIA1.5MM CRSS PIN SELF DRL  ALEXANDRE [employer]ANIOM[employer]OFAC[employer]-  Right 2 Implanted         Drains:   Urinary Catheter 01/27/23 (Active)   Catheter Indications Perioperative use for selected surgical procedures 01/27/23 2200   Site Assessment Swelling 01/27/23 2200   Urine Color Yellow 01/27/23 2200   Urine Appearance Clear 01/27/23 2200   Urine Odor Malodorous 01/27/23 2200   Collection Container Standard 01/27/23 2200   Securement Method Tape 01/27/23 2200   Catheter Best Practices  Drainage tube clipped to bed;Catheter secured to thigh; Bag below bladder; Tamper seal intact 01/27/23 2200   Status Draining 01/27/23 2200   Output (mL) 55 mL 01/27/23 2200       Findings:     Detailed Description of Procedure:   Brief history and indication for surgery: This is a 27year-old --- who presents with --- . I recommended surgery.  I explained the risks which includes surgical pain, infection, bleeding, brain damage, subdural hematoma, subdural hygroma, abdominal injury, shunt malfunction, infection, need for more surgery, medical and anesthesia risks. Alternatives is no surgery. All questions are answered I was asked to proceed with surgery. Operative details: The patient was brought to the operating room by anesthesia team. Large-bore IVs were secured,guerrero was placed. patient was induced and intubated by the anesthesiology team.  Patient was positioned supine to the OR table with the head on a Saxena horseshoe head zaman. The head was turned to the contralateral side, keeping the posterior parietal area elevated and levelled with the chest.  Arms and all points of contact of patient to bed were thoroughly padded. Patient was secured to the table by seatbelt and cloth wrap. Medtronic Stealth Axiom image guidance was set up with reference fiducial placed on the patient's glabella and patient was registered to the image guidance. A --- frontal horn target was chosen for the shunt and a -- entry point was selected. The hair was shaved in this area down to the neck. A --shaped incision was planned in the --- area. A - cm horizontal incision was planed in the  right upper quadrant of  Abdomen. The scalp and the abdomen and all skin surface between was thoroughly washed with alcohol and scrubbed with ChloraPrep. This area was sterilely draped. Then, all members of the operative team changed their outer gloves. Preop antibiotic was given. A timeout was called and all members operative team agreed to the procedure. 1% lidocaine with epinephrine was infiltrated into the scalp and abdomen incision. First I started with the abdominal incision. The skin was incised with a 10 blade down to camper's fascia. The camper's fascia and Ken's fascia was dissected with Metzenbaums and hemostasis obtained with bipolar cautery at low settings. Self-retaining retractor was placed. The anterior rectus sheath was then opened approximately 2 cm in a longitudinal direction with Metzenbaums. The underlying anterior rectus muscle was split and the inferior rectus sheath was exposed. This was opened with Metzenbaum and the underlying peritoneum was seen. The peritoneum was then opened with Metzenbaum about 0.5cm  exposing the omentum. The peritoneum was lifted up clearly showing the peristaltic movement of the momentum underneath and entry into the peritoneum was confirmed by probing with a Penfield 3. This wound was then covered with a wet lap. The scalp was incised with a 10 blade down to the level of pericranium. Hemostasis was obtained with bipolar at low settings. A sub-galeal flap was undermined to allow for pocket for the shunt valve and the flap was maintained with a galeal suture tied inferiorly to the drapes. Using image guidance, the planned entry point was marked on the pericranium. Using cautery, the bone was exposed at that position to allow for drilling. Using a high-speed acorn bit drill a bur hole was created. Then a tunneler was used to create a subcutaneous path for the peritoneal implant from the proximal scalp wound to the distal abdominal wound. The peritoneal shunt catheter connected to a [employer] valve (at performance -) was pre-primed with saline and this was passed down the tunneler. The dura was bipolared and opened in a small cruciate fashion. A small corticotomy was created by the bipolar. Then an antibiotic impregnated ventricular catheter was cannulated and guided into the - frontal horn with the Axiom shunt passing EM tracker at -cm at the level of dura. The last 3 cm of the catheter was soft passed after CSF flowed spontaneously. The tracker was removed and catheter was connected to a manometer with a opening pressure of - cm of water.   CSF was sent for stat Gram stain culture glucose and protein and cell count. The proximal connector of the valve was connected to the ventricular catheter using forceps and avoiding any manual manipulation. The reservoir was pumped until there was flow at the distal shunt catheter and observed to have spontaneous flow even without pumping. The ventricular catheter and the valve was then secured by a silk tie. The peritoneal shunt catheter was then inserted in the peritoneum under direct vision and any slack in the catheter system was removed. The wounds were thoroughly irrigated with saline solution. The abdomen was close in multiple layers first the peritoneum, then posterior rectus sheath, then anterior rectus sheath   With interrupted sutures. Vancomycin powder was onlayed on the wound. A stay suture was sutured on the anterior sheath and tied to the catheter without kinking. The superficial fascia was approximated with interrupted sutures, the skin was closed with interrupted inverted sutures. At the scalp incision, the valve was secured onto the pericranium with a permanent suture. The right angled catheter reinforcer at the bend of the bone was then secured to the pericranium with a permanent suture. This wound was thoroughly irrigated. Vancomycin powder was onlayed. Junius Care was closed with inverted sutures and skin closed with running 4-0 Monocryl's and Dermabond. The ventricular and peritoneal shunt catheter was handled with instruments without direct manual manipulation for the entire operation. Patient was then extubated and recovered in the PACU without complications. All counts were correct at end the procedure.       Electronically signed by Alison Varghese DO on 1/28/2023 at 3:33 AM

## 2023-01-28 NOTE — ANESTHESIA POSTPROCEDURE EVALUATION
Department of Anesthesiology  Postprocedure Note    Patient: Donna Kenney  MRN: [id number]  [place]: 1990  Date of evaluation: 1/28/2023      Procedure Summary     Date: 01/27/23 Room / Location: [address]    Anesthesia Start: 2220 Anesthesia Stop: 01/28/23 0349    Procedure: VENTRICULAR PERITONEAL SHUNT REVISION, REMOVAL OF [address] VENTRICULAR PERITONEAL SHUNT (Right: Head) Diagnosis: Hydrocephalus, unspecified type ([facility] [address]    Surgeons: Mauro Haro DO Responsible Provider: Denver Northern, MD    Anesthesia Type: general ASA Status: 3 - Emergent          Anesthesia Type: No value filed.     Phuong Phase I:      Phuong Phase II:        Anesthesia Post Evaluation    Patient location during evaluation: bedside  Patient participation: complete - patient participated  Level of consciousness: awake  Airway patency: patent  Nausea & Vomiting: no nausea and no vomiting  Complications: no  Cardiovascular status: hemodynamically stable  Respiratory status: acceptable  Hydration status: stable  Comments: /80   Pulse 78   Temp 99.2 °F (37.3 °C) (Temporal)   Resp 15   Wt 61 lb 1.1 oz (27.7 kg)   SpO2 99%   BMI 12.76 kg/m²

## 2023-01-28 NOTE — PROGRESS NOTES
Neurosurgery AFIA/Resident    Daily Progress Note   No chief complaint on file. 1/28/2023  11:55 AM    Chart reviewed. No acute events overnight. No new complaints. Denies headache and nausea. Vitals:    01/28/23 0535 01/28/23 0600 01/28/23 0700 01/28/23 0800   BP:  126/81 100/74 109/71   Pulse:  67 86 65   Resp: 15 14 12 15   Temp:  97.7 °F (36.5 °C)  98.5 °F (36.9 °C)   TempSrc:    Oral   SpO2:       Weight:             PE: AOx3   CNII-XII intact   PERRL, EOMI   MAEW, ROM at baseline    Sensation intact     Incision c/d/i      Lab Results   Component Value Date    WBC 15.0 (H) 01/28/2023    HGB 12.5 01/28/2023    HCT 38.6 01/28/2023    PLT See Reflexed IPF Result 01/28/2023    ALT 13 01/27/2023    AST 15 01/27/2023     01/27/2023    K 3.7 01/27/2023     01/27/2023    CREATININE <0.40 (L) 01/27/2023    BUN 4 (L) 01/27/2023    CO2 22 01/27/2023    INR 1.2 01/27/2023       Radiology   CT HEAD WO CONTRAST    Result Date: 1/28/2023  EXAMINATION: CT OF THE HEAD WITHOUT CONTRAST  1/28/2023 4:46 am TECHNIQUE: CT of the head was performed without the administration of intravenous contrast. Automated exposure control, iterative reconstruction, and/or weight based adjustment of the mA/kV was utilized to reduce the radiation dose to as low as reasonably achievable. COMPARISON: Approximately 7 hours earlier. HISTORY: ORDERING SYSTEM PROVIDED HISTORY: NSG postop TECHNOLOGIST PROVIDED HISTORY: NSG postop Is the patient pregnant?->No Reason for Exam: NSG postop FINDINGS: BRAIN/VENTRICLES: Of right posterolateral approach ventricular shunt terminating at the medial aspect of the anterior body of the right lateral ventricle has been replaced. The abandoned shunt terminating at the anterior aspect of the right temporal horn is unchanged. Minimal postop air is present within the anterior horn of the right lateral ventricle and there is a trace of hemorrhage in the posterior horn.   Agenesis of the corpus callosum is noted. Central white matter is minimal and there is prominent ventriculomegaly. No suggestion of hydrocephalus. Unchanged is the fluid-filled left posterolateral subdural implant. ORBITS: The visualized portion of the orbits demonstrate no acute abnormality. SINUSES: The visualized paranasal sinuses and mastoid air cells demonstrate no acute abnormality. SOFT TISSUES/SKULL:  Postop changes of shunt tubing replacement posterolaterally on the right. Old right posterolateral gretchen hole noted. Prior large lateral to posterior left craniotomy defect. Satisfactory postop appearance following shunt revision with chronic findings as described. A/P  28 y.o. 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        Berkley Richards PA-C  1/28/23  11:55 AM

## 2023-01-28 NOTE — CONSULTS
Intra-operative Consult Note:  Called to OR for assistance with intra-abdominal portion of  shunt placement. In brief, patient is a 28yoF w/ pmh of CP and prior ICH with  shunt (placed at age 6) who was brought to [facility] ED w/ AMS by father. Subsequently transferred here for  shunt revision after being found to have hydrocephalus. She has a small, scaphoid abdomen with minimal anterior abdominal wall musculature. Upon entering the operating theater, the anterior and posterior rectus sheaths were already open and nicely identified. The patient had redundant peritoneum. This was grasped, elevated, and bluntly entered with a mosquito clamp. Bowel was identified underneath with no inadvertent bowel injury. The peritoneal edges were secured with mosquito clamps for placement of the  shunt catheter. The abdominal portion of the case was then handed back over to Dr Dangelo Diez. Please refer to their separately dictated operative report for further details of the procedure. Please call the trauma service w/ any questions/concerns. S.  Mariely Potts MD  Acute Care Surgery Attending  [facility]

## 2023-01-28 NOTE — PROGRESS NOTES
Daily Progress Note  Neuro Critical Care    Patient Name: Kaye Sadler  Patient : 1990  Room/Bed: 0126/0126-01  Code Status: FULL  Allergies: No Known Allergies    CHIEF COMPLAINT:      AMS     INTERVAL HISTORY    Initial Presentation (Admitted 2023): The patient is a 28 y.o. female history of infantile cerebral palsy, seizure disorder (on phenobarbital 64.8 mg qd/97.2 mg nightly and aptiom 600 mg qd), lithotripsy with stent removal in , ? Past ICH, and brain surgeries with shunt placements x2 with who presents on 2023 as a direct admit from [facility] ED for management of hydrocephalus in setting of  shunt malfunction. History/etiology of  shunt unknown. Pt reportedly has wheelchair and is verbal/interactive at baseline. Father is main caregiver. She usually converses and has coffee with father but had depressed consciousness after defecation syncope so family took her to the ED. CTH at outlying facility showed malfunction with mild hydrocephalus shunt appearing to be disconnected in the right occipital scalp/upper neck and dilation of third lateral ventricles. This prompted transfer patient to [address] [place] [address] for emergent neurosurgical evaluation. On initial encounter with [place] team, patient found unresponsive but withdrawing to pain with intact pupillary reflexes, and maintaining airway. Hospital Course:   : Admitted to NICU, neurosurgical consultation for possible shunt failure. Taken to OR for shunt replacement    Last 24h:   Doing well post op. No acute events overnight.  Tolerating liquid diet    CURRENT MEDICATIONS:  SCHEDULED MEDICATIONS:   sodium chloride flush  5-40 mL IntraVENous 2 times per day    ceFAZolin  2,000 mg IntraVENous Q8H    PHENobarbital  64.8 mg Oral Daily    PHENobarbital  97.2 mg Oral Nightly    OXcarbazepine  300 mg Oral BID    sodium chloride flush  5-40 mL IntraVENous 2 times per day     CONTINUOUS INFUSIONS:   sodium chloride      sodium chloride      sodium chloride 68 mL/hr at 23 2153     PRN MEDICATIONS:   sodium chloride flush, sodium chloride, acetaminophen **OR** acetaminophen, oxyCODONE-acetaminophen **OR** oxyCODONE-acetaminophen, sodium chloride flush, sodium chloride, LORazepam, ondansetron **OR** ondansetron, polyethylene glycol    VITALS:  Temperature Range: Temp: 98.5 °F (36.9 °C) Temp  Av.9 °F (37.2 °C)  Min: 97.7 °F (36.5 °C)  Max: 99.6 °F (37.6 °C)  BP Range: Systolic (64HPN), JPF:477 , Min:98 , WNW:604     Diastolic (61VYS), RDB:95, Min:65, Max:93    Pulse Range: Pulse  Av.5  Min: 50  Max: 100  Respiration Range: Resp  Av.6  Min: 12  Max: 22  Current Pulse Ox: SpO2: 99 %  24HR Pulse Ox Range: SpO2  Av.5 %  Min: 96 %  Max: 100 %  Patient Vitals for the past 12 hrs:   BP Temp Temp src Pulse Resp SpO2   23 0800 109/71 98.5 °F (36.9 °C) Oral 65 15 --   23 0700 100/74 -- -- 86 12 --   23 0600 126/81 97.7 °F (36.5 °C) -- 67 14 --   23 0535 -- -- -- -- 15 --   23 0430 131/80 99.2 °F (37.3 °C) Temporal 78 14 99 %   23 0415 131/80 -- -- 90 14 --   23 0400 124/78 -- -- 91 14 98 %   23 0345 111/78 98.8 °F (37.1 °C) Temporal 100 12 100 %     Estimated body mass index is 12.76 kg/m² as calculated from the following:    Height as of 23: 4' 10\" (1.473 m). Weight as of this encounter: 61 lb 1.1 oz (27.7 kg).   [x]<16 Severe malnutrition  []16-16.99 Moderate malnutrition  []17-18.49 Mild malnutrition  []18.5-24.9 Normal  []25-29.9 Overweight (not obese)  []30-34.9 Obese class 1 (Low Risk)  []35-39.9 Obese class 2 (Moderate Risk)  []?40 Obese class 3 (High Risk)    RECENT LABS:   Lab Results   Component Value Date    WBC 15.0 (H) 2023    HGB 12.5 2023    HCT 38.6 2023    PLT See Reflexed IPF Result 2023    ALT 13 2023    AST 15 2023     2023    K 3.7 2023     2023    CREATININE <0.40 (L) 01/27/2023    BUN 4 (L) 01/27/2023    CO2 22 01/27/2023    INR 1.2 01/27/2023     24 HOUR INTAKE/OUTPUT:  Intake/Output Summary (Last 24 hours) at 1/28/2023 1102  Last data filed at 1/28/2023 3606  Gross per 24 hour   Intake 1900 ml   Output 610 ml   Net 1290 ml       IMAGING:   CT HEAD WO CONTRAST    Result Date: 1/28/2023  Satisfactory postop appearance following shunt revision with chronic findings as described. CT HEAD WO CONTRAST    Result Date: 1/27/2023  Partially improved ventriculomegaly. CT HEAD WO CONTRAST    Result Date: 1/27/2023  Shunt malfunction with marked hydrocephalus. Fourth ventricle normal size. Third and lateral ventricles are dilated. The shunt appears to be disconnected in the right occipital scalp/upper neck. The findings were discussed with Dr. Luci Alcala at 1300 hours. XR CHEST PORTABLE    Result Date: 1/26/2023  Nonacute portable chest.     XR SHUNT SERIES PLACEMENT (<4 VIEWS)    Result Date: 1/27/2023  1. Right parietal approach ventricular shunt catheter terminates near midline. The catheter is non continuous with a disconnected catheter fragment is visualized in the chest and abdomen. 2. There is an additional residual catheter fragment also in the right cranium. Labs and Images reviewed with:  [] Dr. Soha Luis. Brandi    [x] Dr. Brigitte Long  [] Dr. Navarro Ervin  [] There are no new interval images to review. PHYSICAL EXAM       CONSTITUTIONAL:  Thin, alert and oriented to person and place, in no acute distress.    HEAD:  normocephalic   EYES:  PERRLA, EOMI.   ENT:  moist mucous membranes   NECK:  supple, symmetric   LUNGS:  Equal air entry bilaterally   CARDIOVASCULAR:  normal s1 / s2, RRR, distal pulses intact   ABDOMEN:  Soft, no rigidity   NEUROLOGIC:  Mental Status:  Oriented to person and place             Cranial Nerves:    cranial nerves II-XII are grossly intact    Motor Exam:    Drift:  absent  Tone:  normal    Moving all 4 extremities antigravity    Sensory:    Touch:    Right Upper Extremity:  normal  Left Upper Extremity:  normal  Right Lower Extremity:  normal  Left Lower Extremity:  normal    Deep Tendon Reflexes:    Right Bicep:  2+  Left Bicep:  2+  Right Knee:  2+  Left Knee:  2+    Plantar Response:  Right:  downgoing  Left:  downgoing    Clonus:  N/A  Rossi's:  N/A    Gait:  not tested due to condition   DRAINS:  [] There are no drains for Neuro Critical Care to monitor at this time. ASSESSMENT AND PLAN:       This is a 28 y.o. female with history of cerebral palsy, seizures,  shunt who initially presented with depressed consciousness at [facility] facility, rapidly progressing worsening and mentation to unresponsive state over the course of day, was found with CT head showing disconnected  shunt and hydrocephalus subsequently transferred to [place] for neurosurgical evaluation and neuro critical care. Patient care will be discussed with attending, will reevaluate patient along with attending. NEUROLOGIC:  - Imaging Post op CT head completed. XR Shunt series pending  - AEDs Phenobarbital 32.4mg (2 tabs in AM and 3 tabs at bedtime), Aptiom 600mg daily (not on formulary, substituted to trileptal 300mg BID)  - Goal SBP <140  - Neuro checks per protocol  - Shunt mgmt per NSG    CARDIOVASCULAR:  - Goal SBP <140  - Continue telemetry    PULMONARY:  - doing well on RA    RENAL/FLUID/ELECTROLYTE:  - BUN 4/ Creatinine <0.40  - IVF: Guille@Ventrix  - Replace electrolytes PRN  - Daily BMP    GI/NUTRITION:  NUTRITION:  ADULT DIET;  Regular  - Bowel regimen: glycolax prn  - GI prophylaxis: N/A    ID:  - Tmax 99.6  - WBC 8.5  - Infectious work up   - Continue to monitor for fevers  - Daily CBC    HEME:   - H&H 14.6/44.8  - Platelets 232  - Daily CBC    ENDOCRINE:  - Continue to monitor blood glucose, goal <180    OTHER:  - PT/OT/ST   - Code Status: FULL    PROPHYLAXIS:  Stress ulcer: N/A    DVT PROPHYLAXIS:  - SCD sleeves - Thigh High     DISPOSITION:  [x] To remain ICU:  [] OK for out of ICU from Neuro Critical Care standpoint    We will continue to follow along. For any changes in exam or patient status please contact Neuro Critical Care.       Roxi Beltran MD  Neurology PGY-4  Neuro Critical Care  Pager [phone]  1/28/2023     11:02 AM

## 2023-01-29 VITALS
SYSTOLIC BLOOD PRESSURE: 103 MMHG | RESPIRATION RATE: 22 BRPM | HEART RATE: 88 BPM | TEMPERATURE: 97.6 F | BODY MASS INDEX: 12.76 KG/M2 | DIASTOLIC BLOOD PRESSURE: 91 MMHG | WEIGHT: 61.07 LBS | OXYGEN SATURATION: 98 %

## 2023-01-29 LAB
ABSOLUTE EOS #: 0.05 K/UL (ref 0–0.44)
ABSOLUTE IMMATURE GRANULOCYTE: 0.04 K/UL (ref 0–0.3)
ABSOLUTE LYMPH #: 1.98 K/UL (ref 1.1–3.7)
ABSOLUTE MONO #: 0.67 K/UL (ref 0.1–1.2)
BASOPHILS # BLD: 1 % (ref 0–2)
BASOPHILS ABSOLUTE: 0.04 K/UL (ref 0–0.2)
EOSINOPHILS RELATIVE PERCENT: 1 % (ref 1–4)
HCT VFR BLD CALC: 45.5 % (ref 36.3–47.1)
HEMOGLOBIN: 13.4 G/DL (ref 11.9–15.1)
IMMATURE GRANULOCYTES: 1 %
LYMPHOCYTES # BLD: 24 % (ref 24–43)
MCH RBC QN AUTO: 31.3 PG (ref 25.2–33.5)
MCHC RBC AUTO-ENTMCNC: 29.5 G/DL (ref 28.4–34.8)
MCV RBC AUTO: 106.3 FL (ref 82.6–102.9)
MONOCYTES # BLD: 8 % (ref 3–12)
NRBC AUTOMATED: 0 PER 100 WBC
PDW BLD-RTO: 14.9 % (ref 11.8–14.4)
PLATELET # BLD: 180 K/UL (ref 138–453)
PMV BLD AUTO: 12.4 FL (ref 8.1–13.5)
RBC # BLD: 4.28 M/UL (ref 3.95–5.11)
RBC # BLD: ABNORMAL 10*6/UL
SEG NEUTROPHILS: 66 % (ref 36–65)
SEGMENTED NEUTROPHILS ABSOLUTE COUNT: 5.36 K/UL (ref 1.5–8.1)
WBC # BLD: 8.1 K/UL (ref 3.5–11.3)

## 2023-01-29 PROCEDURE — 36415 COLL VENOUS BLD VENIPUNCTURE: CPT

## 2023-01-29 PROCEDURE — 6370000000 HC RX 637 (ALT 250 FOR IP)

## 2023-01-29 PROCEDURE — APPSS30 APP SPLIT SHARED TIME 16-30 MINUTES: Performed by: PHYSICIAN ASSISTANT

## 2023-01-29 PROCEDURE — 85025 COMPLETE CBC W/AUTO DIFF WBC: CPT

## 2023-01-29 PROCEDURE — 6370000000 HC RX 637 (ALT 250 FOR IP): Performed by: FAMILY MEDICINE

## 2023-01-29 PROCEDURE — 99231 SBSQ HOSP IP/OBS SF/LOW 25: CPT | Performed by: PSYCHIATRY & NEUROLOGY

## 2023-01-29 PROCEDURE — 6360000002 HC RX W HCPCS

## 2023-01-29 RX ORDER — DIPHENHYDRAMINE HYDROCHLORIDE 50 MG/ML
10 INJECTION INTRAMUSCULAR; INTRAVENOUS EVERY 6 HOURS PRN
Status: DISCONTINUED | OUTPATIENT
Start: 2023-01-29 | End: 2023-01-29 | Stop reason: HOSPADM

## 2023-01-29 RX ADMIN — Medication 2000 MG: at 02:22

## 2023-01-29 RX ADMIN — OXCARBAZEPINE 300 MG: 300 TABLET, FILM COATED ORAL at 08:21

## 2023-01-29 RX ADMIN — PHENOBARBITAL 64.8 MG: 32.4 TABLET ORAL at 08:21

## 2023-01-29 RX ADMIN — OXYCODONE HYDROCHLORIDE AND ACETAMINOPHEN 1 TABLET: 5; 325 TABLET ORAL at 02:27

## 2023-01-29 ASSESSMENT — PAIN SCALES - GENERAL: PAINLEVEL_OUTOF10: 4

## 2023-01-29 NOTE — PROGRESS NOTES
Patient discharged with documented belongings. All lines removed. Discharge instructions provided and all questions answered. Pt discharged home with her Father.

## 2023-01-29 NOTE — DISCHARGE INSTRUCTIONS
Ventriculoperitoneal () Shunt Discharge Instructions    Thank you for choosing [facility] and [facility] for your surgical needs. The following instructions will help to ensure your comfort and that you are well prepared after your surgery. Post-operative visit:   The office is located at:    [address], [address]    [facility], [address] [facility], main [facility], [facility]    [phone]     Please also call your primary care physician to schedule an appointment for further evaluation and care. Diet:   You may resume your regular diet   Be sure to eat a well-balanced diet. Protein promotes wound healing. Pain medication and decreased activity can cause constipation. Drink 8-10 glasses of water a day, eat fresh fruits and vegetables, and add prunes, raisins and bran cereals to your diet if you do become constipated. A stool softener taken 1-2 times a day is helpful. Dulcolax suppositories or Fleets enemas are also available without a prescription. Call our office if the problem continues. Activity:   Gradually resume your activities as tolerated. The best exercise is walking. You may sleep in any position that is comfortable. Avoid strenuous activity. No lifting more than 10 pounds until you are seen in the office. No driving until you are seen in the office. Avoid riding in a car for the first two weeks until you come to the office for your scheduled follow-up     Incision Care and Hygiene: Both incisions, head and abdomen, are closed with staples or sutures. The staples or sutures should be removed about 2 weeks after surgery. If they are not removed please call the clinic to have them removed. Do not apply ointments or lotions to either incision site. No bathtubs, hot tubs or pools for 2 weeks. It is OK to shower 3-4 days after surgery. Let water run over the incision.  Gently pat the incision dry with a clean towel, do not rub. Leave incision site open to air.     Pain Management:   You will be given a prescription for pain medication.   Try not to take the pain medicine unless you need to. If you feel that you do not need something that strong, you may use regular or extra-strength Tylenol instead.   DO NOT drink alcohol, drive or operate heavy machinery while taking your pain medications.   Notify the office if your pain is not controlled or you need a medication refill before your appointment.     Blood Thinning Medication:   If you were previously on any blood thinning medications (Clopidogrel, Warfarin, aspirin, etc.) that haven’t been restarted, you may resume in 2 weeks following your surgery.     YOU SHOULD CALL THE OFFICE [phone] IF YOU HAVE ANY OF THE FOLLOWING:   Increased pain or pain not relieved with current medications   If you notice any signs of infection such as bleeding, redness, swelling, tenderness, odor, drainage or opening of the incision. Please check your incisions twice daily.   Fevers greater than 101.5 degrees   Flu-like symptoms, chills, shakes, chest pain, shortness of breath, nausea, vomiting, diarrhea   Headache, worsening neck pain, stiffness in the neck, tingling, weakness of your arms or legs, or problems with balance or coordination   New difficulty with urinating or holding your bladder or your bowels   Changes in mental status such as confusion, slurred speech, increased sleepiness     *If you are unable to contact someone at the office and your symptoms persist or increase, call 911 or go to the emergency department.       NOTICE:   Please note that you have a programmable shunt. You are able to have an MRI, but the MRI may change the shunt settings. It is important that you contact your neurosurgeon within 1-2 days of any MRI to see if you will need your shunt reprogrammed.

## 2023-01-29 NOTE — DISCHARGE SUMMARY
Neuro Critical Care   Discharge Summary      PATIENT NAME: Luz Costellote: 1990  MEDICAL RECORD NO. [id number]  Admission DATE:  01/27/2023  PRIMARY CARE PHYSICIAN: Lennie Blue MD  DISCHARGE DATE: 01/29/2023  DISCHARGE DIAGNOSIS:   Patient Active Problem List   Diagnosis Code    Infantile cerebral palsy ([facility] [address] G80.9    Congenital quadriplegia G80.8    Seizure ([facility] [address] R56.9    Pyelonephritis due to Escherichia coli N12, B96.20    Ureteral stone with hydronephrosis N13.2    Renal lithiasis N20.0    Chronic bilateral low back pain without sciatica M54.50, G89.29    Hydrocephalus [facility]) G91.9       Radha Diaz is a 28 y.o. yo female who presented to [facility] on 1/27/2023  7:06 PM with female history of infantile cerebral palsy, seizure disorder (on phenobarbital 64.8 mg qd/97.2 mg nightly and aptiom 600 mg qd), lithotripsy with stent removal in 2015, ? Past ICH, and brain surgeries with shunt placements x2 with who presents on 1/27/2023 as a direct admit from [facility] ED for management of hydrocephalus in setting of  shunt malfunction. History/etiology of  shunt unknown. Pt reportedly has wheelchair and is verbal/interactive at baseline. Father is main caregiver. She usually converses and has coffee with father but had depressed consciousness after defecation syncope so family took her to the ED. CTH at outlying facility showed malfunction with mild hydrocephalus shunt appearing to be disconnected in the right occipital scalp/upper neck and dilation of third lateral ventricles. This prompted transfer patient to [place] for emergent neurosurgical evaluation. .    Patient underwent peritoneal shunt revision on 01/27/2023      Labs and imaging were followed daily. At time of discharge, Conni Jeans was tolerating a ADULT DIET; Regular, having bowel movements, and is in stable  condition to be discharged to Home .         PROCEDURES: Peritoneal shunt revision on 01/27/2023    PHYSICAL EXAMINATION        Discharge Vitals:  weight is 61 lb 1.1 oz (27.7 kg). Her temperature is 98.6 °F (37 °C) (pended). Her blood pressure is 118/78 and her pulse is 86. Her respiration is 17 and oxygen saturation is 98%. CONSTITUTIONAL:  Well developed, well nourished, alert and oriented x 3, in no acute distress. GCS 15. Nontoxic. No dysarthria . No aphasia .    HEAD:  normocephalic, atraumatic    EYES:  PERRLA, EOMI.   ENT:  moist mucous membranes   NECK:  supple, symmetric   LUNGS:  Equal air entry bilaterally   CARDIOVASCULAR:  normal s1 / s2, RRR, distal pulses intact   ABDOMEN:  Soft, no rigidity   NEUROLOGIC:  Mental Status:  Not oriented to person and Not oriented to place,awake             Cranial Nerves:    cranial nerves II-XII are grossly intact    Motor Exam:    Drift:  absent  Tone:  abnormal -  decrease     Moving all 4 extremities     Sensory:    Touch:    Right Upper Extremity:  normal  Left Upper Extremity:  normal  Right Lower Extremity:  normal  Left Lower Extremity:  normal    Deep Tendon Reflexes:    Right Bicep:  1+  Left Bicep:  1+  Right Knee:  1+  Left Knee:  1+    Plantar Response:  Right:  downgoing  Left:  downgoing    Clonus:  absent  Rossi's:  absent         Gait:  unable to assess           LABS/IMAGING     Recent Labs     01/26/23  2315 01/27/23  1129 01/28/23  0619 01/29/23  0735   WBC 6.4 8.5 15.0* 8.1   HGB 14.9 14.6 12.5 13.4   HCT 45.9 44.8 38.6 45.5    229 See Reflexed IPF Result 180    141  --   --    K 3.7 3.7  --   --    CL 99 103  --   --    CO2 24 22  --   --    BUN 4* 4*  --   --    CREATININE 0.49* <0.40*  --   --        CT HEAD WO CONTRAST    Result Date: 1/28/2023  EXAMINATION: CT OF THE HEAD WITHOUT CONTRAST  1/28/2023 4:46 am TECHNIQUE: CT of the head was performed without the administration of intravenous contrast. Automated exposure control, iterative reconstruction, and/or weight based adjustment of the mA/kV was utilized to reduce the radiation dose to as low as reasonably achievable. COMPARISON: Approximately 7 hours earlier. HISTORY: ORDERING SYSTEM PROVIDED HISTORY: NSG postop TECHNOLOGIST PROVIDED HISTORY: NSG postop Is the patient pregnant?->No Reason for Exam: NSG postop FINDINGS: BRAIN/VENTRICLES: Of right posterolateral approach ventricular shunt terminating at the medial aspect of the anterior body of the right lateral ventricle has been replaced. The abandoned shunt terminating at the anterior aspect of the right temporal horn is unchanged. Minimal postop air is present within the anterior horn of the right lateral ventricle and there is a trace of hemorrhage in the posterior horn. Agenesis of the corpus callosum is noted. Central white matter is minimal and there is prominent ventriculomegaly. No suggestion of hydrocephalus. Unchanged is the fluid-filled left posterolateral subdural implant. ORBITS: The visualized portion of the orbits demonstrate no acute abnormality. SINUSES: The visualized paranasal sinuses and mastoid air cells demonstrate no acute abnormality. SOFT TISSUES/SKULL:  Postop changes of shunt tubing replacement posterolaterally on the right. Old right posterolateral gretchen hole noted. Prior large lateral to posterior left craniotomy defect. Satisfactory postop appearance following shunt revision with chronic findings as described. CT HEAD WO CONTRAST    Result Date: 1/27/2023  EXAMINATION: CT OF THE HEAD WITHOUT CONTRAST  1/27/2023 9:18 pm TECHNIQUE: CT of the head was performed without the administration of intravenous contrast. Automated exposure control, iterative reconstruction, and/or weight based adjustment of the mA/kV was utilized to reduce the radiation dose to as low as reasonably achievable. COMPARISON: Imaging from earlier the same day.  HISTORY: ORDERING SYSTEM PROVIDED HISTORY: [facility] hydrocephalus TECHNOLOGIST PROVIDED HISTORY: Stealth protocol AMS hydrocephalus Is the patient pregnant?->No FINDINGS: BRAIN/VENTRICLES: Ventriculomegaly has partially decreased. There is a right parietal approach ventricular shunt catheter. There is also an additional right parietal catheter fragment within the right temporal horn. There is a chronic calcified subdural collection over the left parietal lobe which is unchanged. There is persistent effacement of the cerebral sulci. No acute intracranial hemorrhage. No herniation. ORBITS: The visualized portion of the orbits demonstrate no acute abnormality. SINUSES: The visualized paranasal sinuses and mastoid air cells demonstrate no acute abnormality. SOFT TISSUES/SKULL:  No acute abnormality of the visualized skull or soft tissues. Partially improved ventriculomegaly. CT HEAD WO CONTRAST    Result Date: 1/27/2023  EXAMINATION: CT HEAD WO CONTRAST HISTORY: Unresponsive. COMPARISON: 11/20/2018 CT BRAIN TECHNIQUE: CT examination of the head without IV contrast. Dose reduction techniques were achieved by using automated exposure control and/or adjustment of mA and/or kV according to patient size and/or use of iterative reconstruction technique. FINDINGS: POSITIVES: Shunt malfunction with marked hydrocephalus. The shunt tubing appears to be disconnected in the occipital region of the scalp and upper neck. The ventricles are markedly dilated and there may be transependymal flow of CSF in the left high parietal region. A calcified subdural fluid collection left parietal region is unchanged. Congenital agenesis of the corpus callosum. NEGATIVES: The fourth ventricle is not dilated. The third and lateral ventricles are dilated. COINCIDENTAL: A disconnected shunt fragment in the posterior right ventricle unchanged. ROUTINE: The ventricles are symmetrically dilated and measure up to 3.4 cm in transverse diameter. The sulci are effaced. Shunt malfunction with marked hydrocephalus. Fourth ventricle normal size.  Third and lateral ventricles are dilated. The shunt appears to be disconnected in the right occipital scalp/upper neck. The findings were discussed with Dr. Jamie Simeon at 1300 hours. XR CHEST PORTABLE    Result Date: 1/26/2023  EXAM: XR CHEST PORTABLE HISTORY: Reason for exam:->weakness COMPARISON: Two-view chest from 8/11/2015. TECHNIQUE: Portable chest was done 11:14 PM. FINDINGS: Trachea, mediastinum and heart size are unremarkable. No infiltrate or nodule or effusion or pneumothorax is noted. Diaphragm and bony elements are intact. Artifacts are seen over the field-of-view. Nonacute portable chest.     XR SHUNT SERIES PLACEMENT (<4 VIEWS)    Result Date: 1/28/2023  EXAMINATION: SHUNT SERIES 1/28/2023 3:33 pm COMPARISON: 13 November 2015 HISTORY: ORDERING SYSTEM PROVIDED HISTORY: s/p shunt revision TECHNOLOGIST PROVIDED HISTORY: s/p shunt revision FINDINGS: A  shunt traverses down the right neck, chest and abdomen, coiled but not kinked in the upper abdomen terminating in the left mid abdomen. There appears to be a length of orphan  shunt above the current proximal  shunt. Indwelling right  shunt which appears grossly intact. However, there appears to be a length of orphan shunt located in the right cranium just cranial to the current shunt which is attached to the being in the neck. In 2015, distal shunt terminated in the left lower quadrant. However, proximal portion of the shunt was not visualized on the submitted image of that date. XR SHUNT SERIES PLACEMENT (<4 VIEWS)    Result Date: 1/27/2023  EXAMINATION: SHUNT SERIES 1/27/2023 8:38 pm COMPARISON: None. HISTORY: ORDERING SYSTEM PROVIDED HISTORY: hydrocephalus, disconnected shunt TECHNOLOGIST PROVIDED HISTORY: hydrocephalus, disconnected shunt FINDINGS: There is right parietal approach ventricular shunt catheter terminates near midline. Additional apparent retained catheter fragment in the right brain is also noted.   There is no normal catheter visualized in the extracranial soft tissues or right neck. There is a non connected catheter fragment in the right lower chest wall extending to the right abdomen where it terminates in the right pelvis. No edema in the prevertebral soft tissues. Cardiomediastinal silhouette is normal in size. There is no pleural effusion or pneumothorax. Bowel gas pattern is nonobstructive. No radiopaque nephrolithiasis. No acute osseous abnormality. 1. Right parietal approach ventricular shunt catheter terminates near midline. The catheter is non continuous with a disconnected catheter fragment is visualized in the chest and abdomen. 2. There is an additional residual catheter fragment also in the right cranium. DISCHARGE INSTRUCTIONS     Discharge Medications:        Medication List        CONTINUE taking these medications      Ankle Foot Arthosis Misc  by Does not apply route. Bilateral AFO brace     * Diapers & Supplies Misc  1 each by Does not apply route daily as needed (as needed) Adult LG     * Diapers & Supplies Misc  (Adult medium)  Use daily as needed. [facility]  Gait  with wheels, square with seat sling seat and braces           * This list has 2 medication(s) that are the same as other medications prescribed for you. Read the directions carefully, and ask your doctor or other care provider to review them with you. ASK your doctor about these medications      Aptiom 600 MG Tabs tablet  Generic drug: eslicarbazepine     naproxen sodium 550 MG tablet  Commonly known as: ANAPROX     Periogard 0.12 % solution  Generic drug: chlorhexidine     PHENobarbital 32.4 MG tablet  Commonly known as: LUMINAL     polyethylene glycol 17 GM/SCOOP powder  Commonly known as: GaviLAX  mix 17 grams with liquid and drink BY MOUTH daily     QUEtiapine 25 MG tablet  Commonly known as: SEROQUEL            Diet: ADULT DIET;  Regular diet as tolerated  Activity:  As tolerated Follow-up:   NS   Time Spent for discharge: 30 minutes    Clark Man MD  Neuro Critical Care  1/29/2023, 10:32 AM

## 2023-01-29 NOTE — CARE COORDINATION
Case Management Assessment  Initial Evaluation    Date/Time of Evaluation: 1/29/2023 10:58 AM  Assessment Completed by: Annmarie Moser RN    If patient is discharged prior to next notation, then this note serves as note for discharge by case management. Patient Name: Steffi Ba                   YOB: 1990  Diagnosis: Hydrocephalus [facility]) [G91.9]                   Date / Time: 1/27/2023  7:06 PM    Patient Admission Status: Inpatient   Readmission Risk (Low < 19, Mod (19-27), High > 27): Readmission Risk Score: 12.3    Current PCP: Mita Chamberlain MD  PCP verified by CM? Yes    Chart Reviewed: Yes      History Provided by: Patient  Patient Orientation: Alert and Oriented    Patient Cognition: Alert    Hospitalization in the last 30 days (Readmission):  No    If yes, Readmission Assessment in CM Navigator will be completed. Advance Directives:      Code Status: Full Code   Patient's Primary Decision Maker is: Legal Next of Kin      Discharge Planning:    Patient lives with: Family Members, Parent Type of Home: House  Primary Care Giver: Self  Patient Support Systems include: Parent, Family Members   Current Financial resources: Medicaid  Current community resources: None  Current services prior to admission: Durable Medical Equipment            Current DME: Wheelchair, Sherl Bernabe (power chair)            Type of Home Care services:  None    ADLS  Prior functional level: Assistance with the following:, Bathing, Dressing, Toileting, Cooking, Mobility  Current functional level: Assistance with the following:, Bathing, Dressing, Toileting, Cooking, Mobility    PT AM-PAC:   /24  OT AM-PAC:   /24    Family can provide assistance at DC: Yes  Would you like Case Management to discuss the discharge plan with any other family members/significant others, and if so, who?  Yes (dad)  Plans to Return to Present Housing: Yes  Other Identified Issues/Barriers to RETURNING to current housing: none  Potential Assistance needed at discharge:              Potential DME:    Patient expects to discharge to: [facility] for transportation at discharge: Family    Financial    Payor: [address] [address]  [phone] / Plan: [address] [facility] [address] [address]  [phone] / Product Type: *No Product type* /     Does insurance require precert for SNF: Yes    Potential assistance Purchasing Medications: No  Meds-to-Beds request: Yes      [facility] [employer] [address] [phone] Celia Carry [phone]  [address]  [facility] [address]  Phone: [phone] Fax: [phone]      Notes:    Factors facilitating achievement of predicted outcomes: Family support, Caregiver support, Pleasant, Sense of humor, Has needed Durable Medical Equipment at home, and Home is wheelchair accessible    Barriers to discharge: none    Additional Case Management Notes: Patient is dependent for most needs/ADLs - provided for/by dad, uncle, and grandmother with whom she resides. Per father patient is usually in bed or wheelchair, has a walker at home but does not use it often. [facility]. needs declined - father to transport home. The Plan for Transition of Care is related to the following treatment goals of Hydrocephalus ([facility] [address] [Q19.2]    IF APPLICABLE: The Patient and/or patient representative Loi Ram and her family were provided with a choice of provider and agrees with the discharge plan. Freedom of choice list with basic dialogue that supports the patient's individualized plan of care/goals and shares the quality data associated with the providers was provided to: Patient, Patient Representative   Patient Representative Name: Father Yvette Free     The Patient and/or Patient Representative Agree with the Discharge Plan?  Yes    Andrez Dunne RN  Case Management Department  Ph: [phone]

## 2023-01-29 NOTE — PROGRESS NOTES
Neurosurgery AFIA/Resident    Daily Progress Note   No chief complaint on file. 1/29/2023  7:57 AM    Chart reviewed. No acute events overnight. No new complaints. Tolerating PO. Post op pain controlled. Vitals:    01/29/23 0430 01/29/23 0500 01/29/23 0600 01/29/23 0700   BP: 103/72 100/71 96/66 112/75   Pulse: 99 85 78 76   Resp: 18 12 13 14   Temp:   97.6 °F (36.4 °C)    TempSrc:       SpO2:  98% 99%    Weight:             PE: AOx3   CNII-XII intact   PERRL, EOMI   MAEW, ROM at baseline     Sensation intact      Incision c/d/i      Lab Results   Component Value Date    WBC 15.0 (H) 01/28/2023    HGB 12.5 01/28/2023    HCT 38.6 01/28/2023    PLT See Reflexed IPF Result 01/28/2023    ALT 13 01/27/2023    AST 15 01/27/2023     01/27/2023    K 3.7 01/27/2023     01/27/2023    CREATININE <0.40 (L) 01/27/2023    BUN 4 (L) 01/27/2023    CO2 22 01/27/2023    INR 1.2 01/27/2023       A/P  28 y.o. female with infantile cerebral palsy, distal shunt malfunction, severe hydrocephalus   POD 2 s/sp ventricular peritoneal shunt revision                 - no activity restrictions  - SCDs and lovenox for dvt ppx   - stable for discharge home today   - Neuro checks per floor protocol  - NSG to sign off and follow up 2 weeks post op       Please contact neurosurgery with any changes in patients neurologic status.        Charanjit Mckeon PA-C  1/29/23  7:57 AM

## 2023-01-29 NOTE — PROGRESS NOTES
Daily Progress Note  Neuro Critical Care    Patient Name: Cyrus Artis  Patient : 1990  Room/Bed: 0126/0126-01  Code Status: FULL  Allergies: No Known Allergies    CHIEF COMPLAINT:      AMS     INTERVAL HISTORY    Initial Presentation (Admitted 2023): The patient is a 28 y.o. female history of infantile cerebral palsy, seizure disorder (on phenobarbital 64.8 mg qd/97.2 mg nightly and aptiom 600 mg qd), lithotripsy with stent removal in , ? Past ICH, and brain surgeries with shunt placements x2 with who presents on 2023 as a direct admit from [facility] ED for management of hydrocephalus in setting of  shunt malfunction. History/etiology of  shunt unknown. Pt reportedly has wheelchair and is verbal/interactive at baseline. Father is main caregiver. She usually converses and has coffee with father but had depressed consciousness after defecation syncope so family took her to the ED. CTH at outlying facility showed malfunction with mild hydrocephalus shunt appearing to be disconnected in the right occipital scalp/upper neck and dilation of third lateral ventricles. This prompted transfer patient to [place] for emergent neurosurgical evaluation. On initial encounter with [place] team, patient found unresponsive but withdrawing to pain with intact pupillary reflexes, and maintaining airway. Hospital Course:   : Admitted to NICU, neurosurgical consultation for possible shunt failure. Taken to OR for shunt replacement    : Doing well post op. No acute events overnight. Tolerating liquid diet      Last 24 hours: No any acute event overnight. Patient this morning alert oriented, following command. As per the father patient is back to the baseline.     POD #2 s/p shunt revision    CURRENT MEDICATIONS:  SCHEDULED MEDICATIONS:   sodium chloride flush  5-40 mL IntraVENous 2 times per day    PHENobarbital  64.8 mg Oral Daily    PHENobarbital  97.2 mg Oral Nightly    OXcarbazepine  300 mg Oral BID    sodium chloride flush  5-40 mL IntraVENous 2 times per day     CONTINUOUS INFUSIONS:   sodium chloride      sodium chloride      sodium chloride Stopped (23 1900)     PRN MEDICATIONS:   diphenhydrAMINE, sodium chloride flush, sodium chloride, acetaminophen **OR** acetaminophen, oxyCODONE-acetaminophen **OR** oxyCODONE-acetaminophen, sodium chloride flush, sodium chloride, LORazepam, ondansetron **OR** ondansetron, polyethylene glycol    VITALS:  Temperature Range: Temp: (P) 98.6 °F (37 °C) Temp  Av °F (36.7 °C)  Min: 97.2 °F (36.2 °C)  Max: 98.6 °F (37 °C)  BP Range: Systolic (21QUG), VIT:847 , Min:87 , INF:488     Diastolic (56YZA), CBF:01, Min:58, Max:87    Pulse Range: Pulse  Av.8  Min: 65  Max: 101  Respiration Range: Resp  Avg: 15.3  Min: 11  Max: 22  Current Pulse Ox: SpO2: 98 %  24HR Pulse Ox Range: SpO2  Av.2 %  Min: 93 %  Max: 100 %  Patient Vitals for the past 12 hrs:   BP Temp Pulse Resp SpO2   23 0900 118/78 -- 86 17 98 %   23 0800 103/72 (P) 98.6 °F (37 °C) 77 20 96 %   23 0700 112/75 -- 76 14 98 %   23 0600 96/66 97.6 °F (36.4 °C) 78 13 99 %   23 0500 100/71 -- 85 12 98 %   23 0430 103/72 -- 99 18 --   23 0400 104/73 97.6 °F (36.4 °C) (!) 101 17 --   23 0300 114/79 -- 83 17 --   23 0200 92/60 97.2 °F (36.2 °C) 93 13 --   23 0100 (!) 90/58 -- 73 12 98 %   23 0000 95/63 97.8 °F (36.6 °C) 88 15 95 %   23 2300 87/63 -- 78 17 96 %     Estimated body mass index is 12.76 kg/m² as calculated from the following:    Height as of 23: 4' 10\" (1.473 m). Weight as of this encounter: 61 lb 1.1 oz (27.7 kg).   [x]<16 Severe malnutrition  []16-16.99 Moderate malnutrition  []17-18.49 Mild malnutrition  []18.5-24.9 Normal  []25-29.9 Overweight (not obese)  []30-34.9 Obese class 1 (Low Risk)  []35-39.9 Obese class 2 (Moderate Risk)  []?40 Obese class 3 (High Risk)    RECENT LABS:   Lab Results   Component Value Date    WBC 8.1 01/29/2023    HGB 13.4 01/29/2023    HCT 45.5 01/29/2023     01/29/2023    ALT 13 01/27/2023    AST 15 01/27/2023     01/27/2023    K 3.7 01/27/2023     01/27/2023    CREATININE <0.40 (L) 01/27/2023    BUN 4 (L) 01/27/2023    CO2 22 01/27/2023    INR 1.2 01/27/2023     24 HOUR INTAKE/OUTPUT:No intake or output data in the 24 hours ending 01/29/23 1027    IMAGING:   CT HEAD WO CONTRAST    Result Date: 1/28/2023  Satisfactory postop appearance following shunt revision with chronic findings as described. CT HEAD WO CONTRAST    Result Date: 1/27/2023  Partially improved ventriculomegaly. CT HEAD WO CONTRAST    Result Date: 1/27/2023  Shunt malfunction with marked hydrocephalus. Fourth ventricle normal size. Third and lateral ventricles are dilated. The shunt appears to be disconnected in the right occipital scalp/upper neck. The findings were discussed with Dr. Bill Suazo at 1300 hours. XR CHEST PORTABLE    Result Date: 1/26/2023  Nonacute portable chest.     XR SHUNT SERIES PLACEMENT (<4 VIEWS)    Result Date: 1/27/2023  1. Right parietal approach ventricular shunt catheter terminates near midline. The catheter is non continuous with a disconnected catheter fragment is visualized in the chest and abdomen. 2. There is an additional residual catheter fragment also in the right cranium. Labs and Images reviewed with:  [] Dr. Charlotte Lane. Brandi    [x] Dr. Shola Patel  [] Dr. Era Chapa  [] There are no new interval images to review. PHYSICAL EXAM       CONSTITUTIONAL:  Thin, alert and oriented to person and place, in no acute distress.    HEAD:  normocephalic   EYES:  PERRLA, EOMI.   ENT:  moist mucous membranes   NECK:  supple, symmetric   LUNGS:  Equal air entry bilaterally   CARDIOVASCULAR:  normal s1 / s2, RRR, distal pulses intact   ABDOMEN:  Soft, no rigidity   NEUROLOGIC:  Mental Status:  Oriented to person and place Cranial Nerves:    cranial nerves II-XII are grossly intact    Motor Exam:    Drift:  absent  Tone:  normal    Moving all 4 extremities antigravity    Sensory:    Touch:    Right Upper Extremity:  normal  Left Upper Extremity:  normal  Right Lower Extremity:  normal  Left Lower Extremity:  normal    Deep Tendon Reflexes:    Right Bicep:  2+  Left Bicep:  2+  Right Knee:  2+  Left Knee:  2+    Plantar Response:  Right:  downgoing  Left:  downgoing    Clonus:  N/A  Rossi's:  N/A    Gait:  not tested due to condition   DRAINS:  [] There are no drains for Neuro Critical Care to monitor at this time. ASSESSMENT AND PLAN:       This is a 28 y.o. female with history of cerebral palsy, seizures,  shunt who initially presented with depressed consciousness at outlying facility, rapidly progressing worsening and mentation to unresponsive state over the course of day, was found with CT head showing disconnected  shunt and hydrocephalus subsequently transferred to [place] for neurosurgical evaluation and neuro critical care. Patient care will be discussed with attending, will reevaluate patient along with attending. NEUROLOGIC:    POD #2 s/p ventriculoperitoneal shunt revision    - Imaging Post op CT head completed. XR Shunt series pending  - AEDs Phenobarbital 32.4mg (2 tabs in AM and 3 tabs at bedtime), Aptiom 600mg daily (not on formulary, substituted to trileptal 300mg BID)  - Goal SBP <140  - Neuro checks per protocol  - Shunt mgmt per NSG  - Shunt series x ray - NSG reviewed     CARDIOVASCULAR:  - Goal SBP <140  - Continue telemetry    PULMONARY:  - doing well on RA    RENAL/FLUID/ELECTROLYTE:  - BUN 4/ Creatinine <0.40  - IVF: [email or web address]  - Replace electrolytes PRN  - Daily BMP    GI/NUTRITION:  NUTRITION:  ADULT DIET;  Regular  - Bowel regimen: glycolax prn  - GI prophylaxis: N/A    ID:  - Tmax 99.6  - WBC 8.1  - Infectious work up   - Continue to monitor for fevers  - Daily CBC    HEME:   - H&H 14.6/44.8  - Platelets 555  - Daily CBC    ENDOCRINE:  - Continue to monitor blood glucose, goal <180    OTHER:  - PT/OT/ST   - Code Status: FULL    PROPHYLAXIS:  Stress ulcer: N/A    DVT PROPHYLAXIS:  - SCD sleeves - Thigh High     DISPOSITION:  [] To remain ICU:  [x]  Discharge  today        For any changes in exam or patient status please contact Neuro Critical Care.       Luis Grey MD  Neurology PGY-4  Neuro Critical Care  Pager [phone]  1/29/2023     10:27 AM

## 2023-01-31 LAB
CULTURE: NORMAL
DIRECT EXAM: NORMAL
SPECIMEN DESCRIPTION: NORMAL

## 2023-02-01 ENCOUNTER — TELEPHONE (OUTPATIENT)
Dept: NEUROSURGERY | Age: 33
End: 2023-02-01

## 2023-02-01 DIAGNOSIS — Z98.2 S/P VP SHUNT: Primary | ICD-10-CM

## 2023-02-01 DIAGNOSIS — G91.9 HYDROCEPHALUS, UNSPECIFIED TYPE (HCC): ICD-10-CM

## 2023-02-01 RX ORDER — OXYCODONE HYDROCHLORIDE AND ACETAMINOPHEN 5; 325 MG/1; MG/1
1 TABLET ORAL EVERY 8 HOURS PRN
Qty: 21 TABLET | Refills: 0 | Status: SHIPPED | OUTPATIENT
Start: 2023-02-01 | End: 2023-02-08

## 2023-02-01 NOTE — TELEPHONE ENCOUNTER
Pt's parent called in the office to request  pain medication. They states she was not sent home with anything.

## 2023-02-10 ENCOUNTER — OFFICE VISIT (OUTPATIENT)
Dept: NEUROSURGERY | Age: 33
End: 2023-02-10

## 2023-02-10 VITALS
SYSTOLIC BLOOD PRESSURE: 104 MMHG | RESPIRATION RATE: 22 BRPM | TEMPERATURE: 98 F | DIASTOLIC BLOOD PRESSURE: 78 MMHG | HEART RATE: 95 BPM

## 2023-02-10 DIAGNOSIS — G91.9 HYDROCEPHALUS, UNSPECIFIED TYPE (HCC): Primary | ICD-10-CM

## 2023-02-10 DIAGNOSIS — Z98.2 S/P VP SHUNT: ICD-10-CM

## 2023-02-10 PROCEDURE — 99024 POSTOP FOLLOW-UP VISIT: CPT | Performed by: NURSE PRACTITIONER

## 2023-02-10 NOTE — PROGRESS NOTES
[address]  [address] [facility] [address]  Dept: [phone]    Patient:  Safia Luis  YOB: 1990  Date: 2/10/23    The patient is a 28 y.o. female who presents today for consult of the following problems:     Chief Complaint   Patient presents with    Post-Op Check     Stitches itching         HPI:     Safia Luis is a 28 y.o. female who presents to the office for post-op evaluation s/p  shunt revision. Patient was initially brought to the hospital with concerns regarding decreased mentation. Does have history significant for infantile CP, spasticity, hydrocephalus. Ultimately required shunt revision. Patient presents with father today to office. She is alert, talkative, appropriately interactive. Has returned much closer to her baseline. Incisions all healing well. No discharge, drainage, fevers, chills. No new numbness, tingling or weakness. Does report that she has had several seizures since hospital discharge. Seizures are not new to her, typically has approximately 1/month, frequency has increased. Does have upcoming follow-up with neurologist.  Has appt with neurology on the [facility].   Codman Certas set at 4  No headaches    Moving all extremities-baseline spasticity, contractures  Alert, oriented, answering questions appropriately  Incisions CDI    Date of surgery: 1/27/2023    Assessment and Plan:      1. Hydrocephalus, unspecified type ([facility] [address]    2. S/P  shunt          Plan: We will plan for repeat CT scan, orders provided, can be done locally as the patient travels quite a distance. Maintain upcoming follow-up with neurologist.  Shunt setting checked utilizing electronic , currently set at 4. Next postop in 4-6 weeks with Dr. Darryle Nails.      Followup: Return in about 6 weeks (around 3/24/2023), or if symptoms worsen or fail to improve. Prescriptions Ordered:  No orders of the defined types were placed in this encounter. Orders Placed:  Orders Placed This Encounter   Procedures    CT HEAD WO CONTRAST     Standing Status:   Future     Standing Expiration Date:   2/10/2024     Order Specific Question:   Reason for exam:     Answer:   follow up  shunt revision        Electronically signed by DIANA Ann CNP on 2/10/2023 at 11:43 AM    Please note that this chart was generated using voice recognition Dragon dictation software. Although every effort was made to ensure the accuracy of this automated transcription, some errors in transcription may have occurred.

## 2023-03-06 ENCOUNTER — HOSPITAL ENCOUNTER (OUTPATIENT)
Age: 33
Discharge: HOME | End: 2023-03-06
Payer: COMMERCIAL

## 2023-03-06 ENCOUNTER — HOSPITAL ENCOUNTER (OUTPATIENT)
Dept: CT IMAGING | Age: 33
Discharge: HOME | End: 2023-03-08
Payer: COMMERCIAL

## 2023-03-06 DIAGNOSIS — G91.9 HYDROCEPHALUS, UNSPECIFIED TYPE (HCC): ICD-10-CM

## 2023-03-06 DIAGNOSIS — Z98.2 S/P VP SHUNT: ICD-10-CM

## 2023-03-06 LAB
ABSOLUTE EOS #: 0.1 K/UL (ref 0–0.4)
ABSOLUTE LYMPH #: 1.8 K/UL (ref 1–4.8)
ABSOLUTE MONO #: 0.3 K/UL (ref 0–1)
ALBUMIN SERPL-MCNC: 4 G/DL (ref 3.5–5.2)
ALP SERPL-CCNC: 136 U/L (ref 35–104)
ALT SERPL-CCNC: 10 U/L (ref 5–33)
ANION GAP SERPL CALCULATED.3IONS-SCNC: 12 MMOL/L (ref 9–17)
AST SERPL-CCNC: 9 U/L
BASOPHILS # BLD: 1 % (ref 0–2)
BASOPHILS ABSOLUTE: 0 K/UL (ref 0–0.2)
BILIRUB DIRECT SERPL-MCNC: <0.1 MG/DL
BILIRUB INDIRECT SERPL-MCNC: ABNORMAL MG/DL (ref 0–1)
BILIRUB SERPL-MCNC: 0.2 MG/DL (ref 0.3–1.2)
BUN SERPL-MCNC: 4 MG/DL (ref 6–20)
BUN/CREAT BLD: 9 (ref 9–20)
CALCIUM SERPL-MCNC: 8.9 MG/DL (ref 8.6–10.4)
CHLORIDE SERPL-SCNC: 102 MMOL/L (ref 98–107)
CO2 SERPL-SCNC: 24 MMOL/L (ref 20–31)
CREAT SERPL-MCNC: 0.46 MG/DL (ref 0.5–0.9)
DIFFERENTIAL TYPE: YES
EOSINOPHILS RELATIVE PERCENT: 2 % (ref 0–5)
GFR SERPL CREATININE-BSD FRML MDRD: >60 ML/MIN/1.73M2
GLUCOSE SERPL-MCNC: 92 MG/DL (ref 70–99)
HCT VFR BLD AUTO: 42.4 % (ref 36–46)
HGB BLD-MCNC: 13.9 G/DL (ref 12–16)
LYMPHOCYTES # BLD: 27 % (ref 15–40)
MCH RBC QN AUTO: 30.7 PG (ref 26–34)
MCHC RBC AUTO-ENTMCNC: 32.7 G/DL (ref 31–37)
MCV RBC AUTO: 93.7 FL (ref 80–100)
MONOCYTES # BLD: 5 % (ref 4–8)
PDW BLD-RTO: 14.7 % (ref 12.1–15.2)
PLATELET # BLD AUTO: 250 K/UL (ref 140–450)
POTASSIUM SERPL-SCNC: 4.3 MMOL/L (ref 3.7–5.3)
PROT SERPL-MCNC: 6.9 G/DL (ref 6.4–8.3)
RBC # BLD: 4.52 M/UL (ref 4–5.2)
SEG NEUTROPHILS: 65 % (ref 47–75)
SEGMENTED NEUTROPHILS ABSOLUTE COUNT: 4.2 K/UL (ref 2.5–7)
SODIUM SERPL-SCNC: 138 MMOL/L (ref 135–144)
WBC # BLD AUTO: 6.4 K/UL (ref 3.5–11)

## 2023-03-06 PROCEDURE — 80076 HEPATIC FUNCTION PANEL: CPT

## 2023-03-06 PROCEDURE — 70450 CT HEAD/BRAIN W/O DYE: CPT

## 2023-03-06 PROCEDURE — 80156 ASSAY CARBAMAZEPINE TOTAL: CPT

## 2023-03-06 PROCEDURE — 36415 COLL VENOUS BLD VENIPUNCTURE: CPT

## 2023-03-06 PROCEDURE — 80048 BASIC METABOLIC PNL TOTAL CA: CPT

## 2023-03-06 PROCEDURE — 85025 COMPLETE CBC W/AUTO DIFF WBC: CPT

## 2023-03-07 ENCOUNTER — OFFICE VISIT (OUTPATIENT)
Dept: NEUROSURGERY | Age: 33
End: 2023-03-07
Payer: COMMERCIAL

## 2023-03-07 VITALS
HEIGHT: 58 IN | BODY MASS INDEX: 12.8 KG/M2 | SYSTOLIC BLOOD PRESSURE: 123 MMHG | DIASTOLIC BLOOD PRESSURE: 82 MMHG | OXYGEN SATURATION: 100 % | HEART RATE: 84 BPM | WEIGHT: 61 LBS

## 2023-03-07 DIAGNOSIS — Z98.2 S/P VP SHUNT: Primary | ICD-10-CM

## 2023-03-07 LAB
CARBAMAZEPINE DATE LAST DOSE: ABNORMAL
CARBAMAZEPINE DOSE AMOUNT: ABNORMAL
CARBAMAZEPINE DOSE TIME: ABNORMAL
CARBAMAZEPINE SERPL-MCNC: <2.5 UG/ML (ref 4–12)

## 2023-03-07 PROCEDURE — G8419 CALC BMI OUT NRM PARAM NOF/U: HCPCS | Performed by: NEUROLOGICAL SURGERY

## 2023-03-07 PROCEDURE — 1036F TOBACCO NON-USER: CPT | Performed by: NEUROLOGICAL SURGERY

## 2023-03-07 PROCEDURE — G8484 FLU IMMUNIZE NO ADMIN: HCPCS | Performed by: NEUROLOGICAL SURGERY

## 2023-03-07 PROCEDURE — 99214 OFFICE O/P EST MOD 30 MIN: CPT | Performed by: NEUROLOGICAL SURGERY

## 2023-03-07 PROCEDURE — G8427 DOCREV CUR MEDS BY ELIG CLIN: HCPCS | Performed by: NEUROLOGICAL SURGERY

## 2023-03-07 RX ORDER — AMITRIPTYLINE HYDROCHLORIDE 25 MG/1
TABLET, FILM COATED ORAL
COMMUNITY
Start: 2023-02-21

## 2023-03-23 ENCOUNTER — HOSPITAL ENCOUNTER (OUTPATIENT)
Dept: GENERAL RADIOLOGY | Age: 33
Discharge: HOME | End: 2023-03-25
Payer: COMMERCIAL

## 2023-03-23 ENCOUNTER — HOSPITAL ENCOUNTER (OUTPATIENT)
Age: 33
Discharge: HOME | End: 2023-03-25
Payer: COMMERCIAL

## 2023-03-23 DIAGNOSIS — Z98.2 S/P VP SHUNT: ICD-10-CM

## 2023-03-23 PROCEDURE — 70250 X-RAY EXAM OF SKULL: CPT

## 2023-04-07 ENCOUNTER — HOSPITAL ENCOUNTER (OUTPATIENT)
Dept: CT IMAGING | Age: 33
End: 2023-04-07
Payer: COMMERCIAL

## 2023-04-07 DIAGNOSIS — Z98.2 S/P VP SHUNT: ICD-10-CM

## 2023-04-07 PROCEDURE — 70450 CT HEAD/BRAIN W/O DYE: CPT

## 2023-06-27 ENCOUNTER — OFFICE VISIT (OUTPATIENT)
Dept: NEUROSURGERY | Age: 33
End: 2023-06-27
Payer: COMMERCIAL

## 2023-06-27 VITALS
HEART RATE: 80 BPM | DIASTOLIC BLOOD PRESSURE: 78 MMHG | HEIGHT: 58 IN | SYSTOLIC BLOOD PRESSURE: 115 MMHG | BODY MASS INDEX: 12.8 KG/M2 | TEMPERATURE: 98.1 F | OXYGEN SATURATION: 99 % | WEIGHT: 61 LBS

## 2023-06-27 DIAGNOSIS — Z98.2 S/P VP SHUNT: Primary | ICD-10-CM

## 2023-06-27 DIAGNOSIS — G91.9 HYDROCEPHALUS, UNSPECIFIED TYPE (HCC): ICD-10-CM

## 2023-06-27 PROCEDURE — 99214 OFFICE O/P EST MOD 30 MIN: CPT | Performed by: NEUROLOGICAL SURGERY

## 2023-06-27 PROCEDURE — 1036F TOBACCO NON-USER: CPT | Performed by: NEUROLOGICAL SURGERY

## 2023-06-27 PROCEDURE — G8419 CALC BMI OUT NRM PARAM NOF/U: HCPCS | Performed by: NEUROLOGICAL SURGERY

## 2023-06-27 PROCEDURE — G8427 DOCREV CUR MEDS BY ELIG CLIN: HCPCS | Performed by: NEUROLOGICAL SURGERY

## 2024-02-21 ENCOUNTER — OFFICE VISIT (OUTPATIENT)
Dept: FAMILY MEDICINE CLINIC | Age: 34
End: 2024-02-21
Payer: COMMERCIAL

## 2024-02-21 VITALS
DIASTOLIC BLOOD PRESSURE: 72 MMHG | HEART RATE: 88 BPM | WEIGHT: 78 LBS | BODY MASS INDEX: 16.3 KG/M2 | OXYGEN SATURATION: 98 % | SYSTOLIC BLOOD PRESSURE: 110 MMHG

## 2024-02-21 DIAGNOSIS — G80.9 INFANTILE CEREBRAL PALSY (HCC): Primary | ICD-10-CM

## 2024-02-21 DIAGNOSIS — R56.9 SEIZURE (HCC): ICD-10-CM

## 2024-02-21 DIAGNOSIS — N39.42 URINARY INCONTINENCE WITHOUT SENSORY AWARENESS: ICD-10-CM

## 2024-02-21 DIAGNOSIS — K59.04 CHRONIC IDIOPATHIC CONSTIPATION: ICD-10-CM

## 2024-02-21 PROCEDURE — G8427 DOCREV CUR MEDS BY ELIG CLIN: HCPCS | Performed by: FAMILY MEDICINE

## 2024-02-21 PROCEDURE — 1036F TOBACCO NON-USER: CPT | Performed by: FAMILY MEDICINE

## 2024-02-21 PROCEDURE — 99203 OFFICE O/P NEW LOW 30 MIN: CPT | Performed by: FAMILY MEDICINE

## 2024-02-21 PROCEDURE — G8419 CALC BMI OUT NRM PARAM NOF/U: HCPCS | Performed by: FAMILY MEDICINE

## 2024-02-21 PROCEDURE — G8484 FLU IMMUNIZE NO ADMIN: HCPCS | Performed by: FAMILY MEDICINE

## 2024-02-21 RX ORDER — POLYETHYLENE GLYCOL 3350 17 G/17G
POWDER, FOR SOLUTION ORAL
Qty: 510 G | Refills: 5 | Status: SHIPPED | OUTPATIENT
Start: 2024-02-21

## 2024-02-21 SDOH — ECONOMIC STABILITY: HOUSING INSECURITY
IN THE LAST 12 MONTHS, WAS THERE A TIME WHEN YOU DID NOT HAVE A STEADY PLACE TO SLEEP OR SLEPT IN A SHELTER (INCLUDING NOW)?: NO

## 2024-02-21 SDOH — ECONOMIC STABILITY: INCOME INSECURITY: HOW HARD IS IT FOR YOU TO PAY FOR THE VERY BASICS LIKE FOOD, HOUSING, MEDICAL CARE, AND HEATING?: NOT HARD AT ALL

## 2024-02-21 SDOH — ECONOMIC STABILITY: FOOD INSECURITY: WITHIN THE PAST 12 MONTHS, YOU WORRIED THAT YOUR FOOD WOULD RUN OUT BEFORE YOU GOT MONEY TO BUY MORE.: NEVER TRUE

## 2024-02-21 SDOH — ECONOMIC STABILITY: FOOD INSECURITY: WITHIN THE PAST 12 MONTHS, THE FOOD YOU BOUGHT JUST DIDN'T LAST AND YOU DIDN'T HAVE MONEY TO GET MORE.: NEVER TRUE

## 2024-02-21 ASSESSMENT — ENCOUNTER SYMPTOMS
VOMITING: 0
SHORTNESS OF BREATH: 0
DIARRHEA: 0
BLOOD IN STOOL: 0
NAUSEA: 0
EYE DISCHARGE: 0
CONSTIPATION: 1
COUGH: 0
EYE REDNESS: 0
ABDOMINAL PAIN: 0

## 2024-02-21 ASSESSMENT — PATIENT HEALTH QUESTIONNAIRE - PHQ9
1. LITTLE INTEREST OR PLEASURE IN DOING THINGS: 0
2. FEELING DOWN, DEPRESSED OR HOPELESS: 0
SUM OF ALL RESPONSES TO PHQ QUESTIONS 1-9: 0
SUM OF ALL RESPONSES TO PHQ9 QUESTIONS 1 & 2: 0
SUM OF ALL RESPONSES TO PHQ QUESTIONS 1-9: 0

## 2024-02-21 NOTE — PROGRESS NOTES
HPI Notes    Name: Amie Ordonez  : 1990        Chief Complaint:     Chief Complaint   Patient presents with    Annual Exam     Patient here today for annual physical. Reestablish care. Last OV was 2018.     Constipation     Patient is asking for miralex prescription    Seizures     Patient is followed by  for her seizures.       History of Present Illness:     Amie Ordonez is a 33 y.o.  female who presents with Annual Exam (Patient here today for annual physical. Reestablish care. Last OV was 2018. ), Constipation (Patient is asking for miralex prescription), and Seizures (Patient is followed by  for her seizures.)      [facility]  Annual check up  - pt due for check up exam as last OV was 2018. Pt comes in with her dad today who is her primary caregiver.     Infantile cerebral palsy - pt was born with cerebral palsy and hydrocephalus so pt has a  shunt. In 2023., had to have a shunt revision --- her 3rd shunt in life. Pt has f/u with neurosurgeon in [place] tomorrow.  No vision changes and no headaches.  Pt uses a wheelchair to get around. Pt     Constipation - chronic for years. Pt also has had constipation off and on for years. Pt is also taken miralax for years. Pt needs help to keep monitoring her bowels. No blood in stools. Pt has BMs in her diapers too but occ dad will get her to the bathroom on time.     Urinary incontinence - pt needs help going to the bath room as can't walk on her on. Pt has for years gone at night in her diapers. Pt is not always aware she has to urinate and even if aware she unable to get up and walk to bathroom in time so wears diapers her whole life. Since pt's insurance changed pt has had to buy the diapers size SM/Med over the counter. Pt's dad changes her and keeps her clean.    Seizures - chronic but stable. Pt sees neurologist, Dr Garcias and on the Aptiom and phenobarbital      Past Medical History:     Past Medical History:   Diagnosis Date

## 2024-02-21 NOTE — PATIENT INSTRUCTIONS
SURVEY:    You may be receiving a survey from [facility] [employer] regarding your visit today.    Please complete the survey to enable us to provide the highest quality of care to you and your family.    If you cannot score us a very good (5 Stars) on any question, please call the office to discuss how we could have made your experience a very good one.    Thank you.    Clinical Care Team: MD Sarkis Awan LPN              Triage: Emily Licea CMA              Clerical Team: Emily Grewal

## 2024-02-22 ENCOUNTER — OFFICE VISIT (OUTPATIENT)
Dept: NEUROSURGERY | Age: 34
End: 2024-02-22

## 2024-02-22 VITALS
HEART RATE: 114 BPM | SYSTOLIC BLOOD PRESSURE: 117 MMHG | DIASTOLIC BLOOD PRESSURE: 77 MMHG | BODY MASS INDEX: 16.3 KG/M2 | HEIGHT: 58 IN

## 2024-02-22 DIAGNOSIS — G91.9 HYDROCEPHALUS, UNSPECIFIED TYPE (HCC): ICD-10-CM

## 2024-02-22 DIAGNOSIS — Z98.2 S/P VP SHUNT: Primary | ICD-10-CM

## 2024-02-22 NOTE — PROGRESS NOTES
Department of Neurosurgery                                                      Follow up visit      History Obtained From:  patient, father    CHIEF COMPLAINT:         Chief Complaint   Patient presents with    6 Month Follow-Up    Other     Did have a seizure 2 weeks ago.       HISTORY OF PRESENT ILLNESS:       The patient is a 33 y.o. female who presents for follow up for S/P  shunt (01/27/2023) and hydrocephalus.    Patient has continued complaints about a popping noise in her head. Her father notes that she had a seizure a couple weeks ago, but before then he says that she had no seizures for months. Her father correlates her seizures to stress and reports that the patient is experiencing some more stress due to family issues. She says her headaches usually accompany her seizures.    Upon referral to neurology for the clicking noise the neurologist reportedly did not know the etiology of this symptom either. She was last seen by neurology last week. She had been following the neurology every 3 months previously but currently is following every 6 months. Her father believes that she may be hearing the noise after she uses the bathroom and relieves her constipation. She states that the noise accompanies her head pain.    Patient's shunt appears stable at this point. Neither the patient nor her father report any complications. Imaging corroborates this reported stability. Her shunt is currently set to 5.    PAST MEDICAL HISTORY :       Past Medical History:        Diagnosis Date    Brain bleed (HCC)     past history - at age15    Chronic bilateral low back pain without sciatica 12/8/2016    History of closed head injury     dad also states there was \"blood on her brain\" with this & occassional bleeds into shunt    Infantile cerebral palsy (HCC)     Kidney stone     Seizures (HCC)        Past Surgical History:        Procedure Laterality Date    BRAIN SURGERY      shunts x 2    EYE SURGERY      x2

## 2024-07-09 ENCOUNTER — HOSPITAL ENCOUNTER (OUTPATIENT)
Age: 34
Discharge: HOME | End: 2024-07-09
Payer: COMMERCIAL

## 2024-07-09 LAB
ALBUMIN SERPL-MCNC: 4.4 G/DL (ref 3.5–5.2)
ALP SERPL-CCNC: 105 U/L (ref 35–104)
ALT SERPL-CCNC: 16 U/L (ref 5–33)
ANION GAP SERPL CALCULATED.3IONS-SCNC: 13 MMOL/L (ref 9–17)
AST SERPL-CCNC: 11 U/L
BASOPHILS # BLD: 0.03 K/UL (ref 0–0.2)
BASOPHILS NFR BLD: 0 % (ref 0–2)
BILIRUB DIRECT SERPL-MCNC: <0.1 MG/DL
BILIRUB INDIRECT SERPL-MCNC: ABNORMAL MG/DL (ref 0–1)
BILIRUB SERPL-MCNC: 0.3 MG/DL (ref 0.3–1.2)
BUN SERPL-MCNC: 7 MG/DL (ref 6–20)
BUN/CREAT SERPL: 14 (ref 9–20)
CALCIUM SERPL-MCNC: 9.8 MG/DL (ref 8.6–10.4)
CHLORIDE SERPL-SCNC: 99 MMOL/L (ref 98–107)
CO2 SERPL-SCNC: 24 MMOL/L (ref 20–31)
CREAT SERPL-MCNC: 0.5 MG/DL (ref 0.5–0.9)
EOSINOPHIL # BLD: 0.13 K/UL (ref 0–0.4)
EOSINOPHILS RELATIVE PERCENT: 2 % (ref 0–5)
ERYTHROCYTE [DISTWIDTH] IN BLOOD BY AUTOMATED COUNT: 13.5 % (ref 12.1–15.2)
GFR, ESTIMATED: >90 ML/MIN/1.73M2
GLUCOSE SERPL-MCNC: 109 MG/DL (ref 70–99)
HCT VFR BLD AUTO: 44.2 % (ref 36–46)
HGB BLD-MCNC: 14.9 G/DL (ref 12–16)
IMM GRANULOCYTES # BLD AUTO: 0.01 K/UL (ref 0–0.3)
IMM GRANULOCYTES NFR BLD: 0 % (ref 0–5)
LYMPHOCYTES NFR BLD: 1.08 K/UL (ref 1–4.8)
LYMPHOCYTES RELATIVE PERCENT: 15 % (ref 15–40)
MCH RBC QN AUTO: 31.6 PG (ref 26–34)
MCHC RBC AUTO-ENTMCNC: 33.7 G/DL (ref 31–37)
MCV RBC AUTO: 93.8 FL (ref 80–100)
MONOCYTES NFR BLD: 0.28 K/UL (ref 0–1)
MONOCYTES NFR BLD: 4 % (ref 4–8)
NEUTROPHILS NFR BLD: 79 % (ref 47–75)
NEUTS SEG NFR BLD: 5.94 K/UL (ref 2.5–7)
PLATELET # BLD AUTO: 197 K/UL (ref 140–450)
PMV BLD AUTO: 11.6 FL (ref 6–12)
POTASSIUM SERPL-SCNC: 4 MMOL/L (ref 3.7–5.3)
PROT SERPL-MCNC: 7.8 G/DL (ref 6.4–8.3)
RBC # BLD AUTO: 4.71 M/UL (ref 4–5.2)
SODIUM SERPL-SCNC: 136 MMOL/L (ref 135–144)
WBC OTHER # BLD: 7.5 K/UL (ref 3.5–11)

## 2024-07-09 PROCEDURE — 85025 COMPLETE CBC W/AUTO DIFF WBC: CPT

## 2024-07-09 PROCEDURE — 36415 COLL VENOUS BLD VENIPUNCTURE: CPT

## 2024-07-09 PROCEDURE — 80048 BASIC METABOLIC PNL TOTAL CA: CPT

## 2024-07-09 PROCEDURE — 80076 HEPATIC FUNCTION PANEL: CPT

## 2024-12-30 RX ORDER — POLYETHYLENE GLYCOL 3350 17 G/17G
POWDER, FOR SOLUTION ORAL
Qty: 510 G | Refills: 5 | Status: SHIPPED | OUTPATIENT
Start: 2024-12-30

## 2024-12-30 NOTE — TELEPHONE ENCOUNTER
Last OV: 2/21/2024  Last RX: 2/21/2024   Next scheduled apt: Visit date not found    Father requesting refill

## 2025-08-29 RX ORDER — POLYETHYLENE GLYCOL 3350 17 G/17G
POWDER ORAL
Qty: 510 G | Refills: 5 | Status: SHIPPED | OUTPATIENT
Start: 2025-08-29

## (undated) DEVICE — PREMIUM DRY TRAY LF: Brand: MEDLINE INDUSTRIES, INC.

## (undated) DEVICE — KIT MNOMTR 550MM INCL 3 W STPCOCK W PRT CVR FOR LUM PUNC

## (undated) DEVICE — CLAMP SCALP STR EDGE HVY

## (undated) DEVICE — SUTURE MCRYL SZ 4-0 L18IN ABSRB UD L16MM PC-3 3/8 CIR PRIM Y845G

## (undated) DEVICE — SUTURE PERMAHAND SZ 3-0 L18IN NONABSORBABLE BLK L26MM SH C013D

## (undated) DEVICE — 3.0MM PRECISION NEURO (MATCH HEAD)

## (undated) DEVICE — DRAPE,U/ SHT,SPLIT,PLAS,STERIL: Brand: MEDLINE

## (undated) DEVICE — SUTURE D SPEC VCRL 2 0 D8876

## (undated) DEVICE — SUTURE MCRYL SZ 3-0 L27IN ABSRB UD L24MM PS-1 3/8 CIR PRIM Y936H

## (undated) DEVICE — APPLICATOR MEDICATED 10.5 CC SOLUTION HI LT ORNG CHLORAPREP

## (undated) DEVICE — SUTURE PERMAHAND SZ 2-0 L12X18IN NONABSORBABLE BLK SILK A185H

## (undated) DEVICE — SUTURE MCRYL SZ 3-0 L27IN ABSRB UD L26MM SH 1/2 CIR Y416H

## (undated) DEVICE — GLOVE SURG SZ 65 THK91MIL LTX FREE SYN POLYISOPRENE

## (undated) DEVICE — BATTERY PACK 2 FOR QUIKDRIVE: Brand: UNIVERSAL NEURO 2, QUIKDRIVE

## (undated) DEVICE — ELECTRODE PT RET AD L9FT HI MOIST COND ADH HYDRGEL CORDED

## (undated) DEVICE — TOWEL,OR,DSP,ST,NATURAL,DLX,4/PK,20PK/CS: Brand: MEDLINE

## (undated) DEVICE — GARMENT,MEDLINE,DVT,INT,CALF,MED, GEN2: Brand: MEDLINE

## (undated) DEVICE — PASSER 48409 60CM DISP. CATHETER

## (undated) DEVICE — CONNECTOR 9041 LUERLOCK TO FIT 1.3MM ID

## (undated) DEVICE — SVMMC CRANI PK

## (undated) DEVICE — COVER LT HNDL BLU PLAS

## (undated) DEVICE — SUTURE PERMA-HAND SZ 2-0 L30IN NONABSORBABLE BLK L26MM SH K833H

## (undated) DEVICE — SUTURE MCRYL SZ 4-0 L18IN ABSRB UD L19MM PS-2 3/8 CIR PRIM Y496G

## (undated) DEVICE — GAUZE,SPONGE,FLUFF,6"X6.75",STRL,5/TRAY: Brand: MEDLINE

## (undated) DEVICE — Device: Brand: SNAP ON SPHERZ

## (undated) DEVICE — MARKER,SKIN,WI/RULER AND LABELS: Brand: MEDLINE

## (undated) DEVICE — GLOVE ORANGE PI 8   MSG9080

## (undated) DEVICE — CATHETER EVD L35CM LUMN ID1.5MM DEPTH MRK 3-15CM FOR EXT

## (undated) DEVICE — SUTURE PERMAHAND SZ 3-0 L30IN NONABSORBABLE BLK SILK BRAID A304H

## (undated) DEVICE — SHEET, ORTHO, SPLIT, STERILE: Brand: MEDLINE

## (undated) DEVICE — SUTURE VCRL SZ 3-0 L27IN ABSRB UD L17MM RB-1 1/2 CIR J215H

## (undated) DEVICE — INTENDED FOR TISSUE SEPARATION, AND OTHER PROCEDURES THAT REQUIRE A SHARP SURGICAL BLADE TO PUNCTURE OR CUT.: Brand: BARD-PARKER ® CARBON RIB-BACK BLADES

## (undated) DEVICE — PATIENT TRACKER 9734887 NON-INVASIVE

## (undated) DEVICE — ADHESIVE SKIN CLOSURE TOP 36 CC HI VISC DERMBND MINI

## (undated) DEVICE — DRESSING,GAUZE,XEROFORM,CURAD,1"X8",ST: Brand: CURAD

## (undated) DEVICE — CODMAN® SURGICAL PATTIES 1" X 1" (2.54CM X 2.54CM): Brand: CODMAN®

## (undated) DEVICE — APPLIER CLP L9.38IN M LIG TI DISP STR RNG HNDL LIGACLP

## (undated) DEVICE — GLOVE ORANGE PI 7 1/2   MSG9075

## (undated) DEVICE — SUTURE VCRL SZ 3-0 L18IN ABSRB UD L26MM SH 1/2 CIR J864D

## (undated) DEVICE — GLOVE ORANGE PI 7   MSG9070

## (undated) DEVICE — 3M™ IOBAN™ 2 ANTIMICROBIAL INCISE DRAPE 6650EZ: Brand: IOBAN™ 2

## (undated) DEVICE — DRESSING TRNSPAR W5XL4.5IN FLM SHT SEMIPERMEABLE WIND

## (undated) DEVICE — GOWN,SIRUS,NONRNF,SETINSLV,XL,20/CS: Brand: MEDLINE

## (undated) DEVICE — 1016 S-DRAPE IRRIG POUCH 10/BOX: Brand: STERI-DRAPE™

## (undated) DEVICE — STYLET 9735428 2 COIL SINGLE PACKAGE

## (undated) DEVICE — BOOT SUT STD RED IN BLU SMOOTH RADPQ